# Patient Record
Sex: FEMALE | Race: WHITE | NOT HISPANIC OR LATINO | Employment: STUDENT
[De-identification: names, ages, dates, MRNs, and addresses within clinical notes are randomized per-mention and may not be internally consistent; named-entity substitution may affect disease eponyms.]

---
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
## 2023-01-05 ENCOUNTER — OFFICE VISIT (OUTPATIENT)
Dept: FAMILY MEDICINE | Facility: CLINIC | Age: 18
End: 2023-01-05
Payer: COMMERCIAL

## 2023-01-05 VITALS
DIASTOLIC BLOOD PRESSURE: 76 MMHG | BODY MASS INDEX: 23.69 KG/M2 | RESPIRATION RATE: 18 BRPM | HEIGHT: 65 IN | OXYGEN SATURATION: 98 % | TEMPERATURE: 98.3 F | HEART RATE: 70 BPM | WEIGHT: 142.2 LBS | SYSTOLIC BLOOD PRESSURE: 120 MMHG

## 2023-01-05 DIAGNOSIS — Z00.00 HEALTHCARE MAINTENANCE: ICD-10-CM

## 2023-01-05 DIAGNOSIS — F64.9 GENDER DYSPHORIA: Primary | ICD-10-CM

## 2023-01-05 DIAGNOSIS — F84.0 AUTISM SPECTRUM DISORDER: ICD-10-CM

## 2023-01-05 PROBLEM — F98.8 ATTENTION DEFICIT DISORDER (ADD): Status: ACTIVE | Noted: 2023-01-05

## 2023-01-05 PROBLEM — Z82.49 FAMILY HISTORY OF HIGH BLOOD PRESSURE: Status: ACTIVE | Noted: 2023-01-05

## 2023-01-05 PROBLEM — F41.0 GENERALIZED ANXIETY DISORDER WITH PANIC ATTACKS: Status: ACTIVE | Noted: 2019-09-26

## 2023-01-05 PROBLEM — F41.1 GENERALIZED ANXIETY DISORDER WITH PANIC ATTACKS: Status: ACTIVE | Noted: 2019-09-26

## 2023-01-05 PROCEDURE — 99205 OFFICE O/P NEW HI 60 MIN: CPT | Performed by: NURSE PRACTITIONER

## 2023-01-05 PROCEDURE — 3008F BODY MASS INDEX DOCD: CPT | Performed by: NURSE PRACTITIONER

## 2023-01-05 RX ORDER — NORETHINDRONE ACETATE AND ETHINYL ESTRADIOL .03; 1.5 MG/1; MG/1
TABLET ORAL
Qty: 90 EACH | Refills: 3 | Status: SHIPPED | OUTPATIENT
Start: 2023-01-05 | End: 2023-03-10 | Stop reason: SDUPTHER

## 2023-01-05 RX ORDER — FLUOXETINE 20 MG/1
20 TABLET ORAL DAILY
COMMUNITY
Start: 2022-10-22 | End: 2023-01-18 | Stop reason: SDUPTHER

## 2023-01-05 ASSESSMENT — PATIENT HEALTH QUESTIONNAIRE - PHQ9: SUM OF ALL RESPONSES TO PHQ9 QUESTIONS 1 & 2: 1

## 2023-01-05 NOTE — PATIENT INSTRUCTIONS
Take the pills as they are In the packages  After 2 packages, you can start to skip the placebo pills and only take the ones that are in color (the 3 weeks).

## 2023-01-05 NOTE — PROGRESS NOTES
"      Subjective      Patient ID: Mina Joyner is a 17 y.o. adult.  2005      Here to establish GAHT and discuss options for starting.  Mom and dad are in room, they are supportive but concerned.     HPI      Gender Dysphoria  - recalls 6 years ago had a ffm friend in  which made him think about gender vaguely  -out as bisexual in Middle school, did not seem complicated to him but was for conservative family.  - around 11th grade, started to look more online and read about transgender and gender expression but did not change gender expression out of fear for angering family.   - recalls he used to create characters in stories and would base them around different gender roles  - would try out dffferent pronouns online, she/they, he they  - out at college as Mina and he. Feels comfortable there and well supported  - dysphoria around voice, chest, (wears binder), menses.  - from testosterone wants deeper voice, masculine face, facial hair \"to look generally more masculine\"  - worried about [employer] family and their acceptance/rejection  - worried he will make mom \"really upset\"        The following have been reviewed and updated as appropriate in this visit:   Allergies  Meds  Problems  Surg Hx       Review of Systems  Constitutional: Negative for chills and fever.   Eyes: Negative for visual disturbance.   Respiratory: Negative for shortness of breath.    Cardiovascular: Negative for chest pain and leg swelling.   Gastrointestinal: Negative for abdominal pain, nausea and vomiting.   Genitourinary: Negative for difficulty urinating, dysuria, flank pain, frequency, hematuria and urgency.   Musculoskeletal: Negative for myalgias.   Neurological: Negative for weakness, light-headedness and numbness.   See HPI for abnormal systems and evaluation of them    Patient Active Problem List   Diagnosis   • Attention deficit disorder (ADD)   • Autism spectrum disorder   • Depression   • Generalized anxiety disorder with " "panic attacks   • Family history of high blood pressure   • Gender dysphoria   • Healthcare maintenance       Current Outpatient Medications on File Prior to Visit   Medication Sig Dispense Refill   • FLUoxetine (PROzac) 20 mg tablet Take 20 mg by mouth daily.     • multivitamin capsule daily.       No current facility-administered medications on file prior to visit.         Objective     Vitals:    01/05/23 1657   BP: 120/76   BP Location: Right upper arm   Patient Position: Sitting   Pulse: 70   Resp: 18   Temp: 36.8 °C (98.3 °F)   TempSrc: Temporal   SpO2: 98%   Weight: 64.5 kg (142 lb 3.2 oz)   Height: 1.651 m (5' 5\")     Body mass index is 23.66 kg/m².    Physical Exam  Constitutional:       Comments: Masculine presenting  Short hair, male clothes, wearing binder  confident in male name and expression.    Neurological:      General: No focal deficit present.      Mental Status: He is oriented to person, place, and time.   Psychiatric:      Comments: Shy initially, less so during conversation.   Articulate with good self awareness.           Assessment/Plan   Diagnoses and all orders for this visit:    Gender dysphoria (Primary)  -     norethindrone ac-eth estradioL (LOESTRIN 1.5/30, 21,) 1.5-30 mg-mcg tablet; Take 1 pill each night for 90 days  -     TSH w reflex FT4; Future  -     CBC; Future  -     Comprehensive metabolic panel; Future  -     Testosterone; Future  Meets the following criteria for diagnosis of  gender dysphoria in adolescents and adults as:  A marked incongruence between one’s experienced/expressed gender and their assigned gender, lasting at least 6 months, as manifested by at least two of the following:    A marked incongruence between one’s experienced/expressed gender and primary and/or secondary sex characteristics (or in young adolescents, the anticipated secondary sex characteristics)  A strong desire to be rid of one’s primary and/or secondary sex characteristics because of a marked " incongruence with one’s experienced/expressed gender (or in young adolescents, a desire to prevent the development of the anticipated secondary sex characteristics)  A strong desire for the primary and/or secondary sex characteristics of the other gender  A strong desire to be of the other gender (or some alternative gender different from one’s assigned gender)  A strong desire to be treated as the other gender (or some alternative gender different from one’s assigned gender)  A strong conviction that one has the typical feelings and reactions of the other gender (or some alternative gender different from one’s assigned gender)  In order to meet criteria for the diagnosis, the condition must also be associated with clinically significant distress or impairment in social, occupational, or other important areas of functioning.    · Discussed with patient risks and benefits of taking testosterone and timeline of expected changes from hormones for medical transition  · PMH, medication list and social/sexual and family histories reviewed today  · Guidance provided with specific attention to fertility preservation options, irreversible changes with use of hormones for medical transition including: Deepening voice, development of facial and body hair, changes to genitals and possible loss of hair on head.  · Absence of suicidality confirmed verbally today in visit  · Gender identity confirmed as congruent with gender expression.  · Gender identity is insistent, consistent and persistent as outlined in HPI with criteria met for dx of gender dysphoria  · Labs to be done in 3 months as well as follow up visit at that time.  · Reviewed in visit, nationally recognized guidelines and recommendations for gender affirming hormones for transition.  · Information document was received outlining all above information; Patient verbalizes understanding and verifies that all questions at this time have been answered    · Discussed with  patient risks and benefits of taking testosterone and timeline of expected changes from hormones for medical transition  · PMH, medication list and social/sexual and family histories reviewed today  · Guidance provided with specific attention to fertility preservation options, irreversible changes with use of hormones for medical transition including: Deepening voice, development of facial and body hair, changes to genitals and possible loss of hair on head.  · Absence of suicidality confirmed verbally today in visit  · Gender identity confirmed as congruent with gender expression.  · Gender identity is insistent, consistent and persistent as outlined in HPI with criteria met for dx of gender dysphoria  · Labs to be done in 3 months as well as follow up visit at that time.  · Reviewed in visit, nationally recognized guidelines and recommendations for gender affirming hormones for transition.  · Information document was received outlining all above information; Patient verbalizes understanding and verifies that all questions at this time have been answered      Met with Mina alone and with mom and dad      We agreed to start COBC at this time and plan to start testosterone in arch when he is home from school  Encouraged mom and dad to check out support group, resources provided    We reviewed anticipated changes from testosterone this evening,Mina will sign consent when he returns  I would like to stay in touch with his therapist at school and need that contact information.     I reached out to network to locate a therapist in [place] who can do some specific gender work with him.       Autism  - stable condition  - no changes to Prozac or recommendations for intervention    Healthcare maintenance  -     HIV 1,2 AB P24 AG; Future  -     Hepatitis C antibody; Future      I spent 60 minutes on this date of service performing the following activities: obtaining history, performing examination, entering orders, documenting and  providing counseling and education.

## 2023-01-18 RX ORDER — FLUOXETINE 20 MG/1
20 TABLET ORAL DAILY
Qty: 90 TABLET | Refills: 1 | Status: SHIPPED | OUTPATIENT
Start: 2023-01-18 | End: 2023-07-17

## 2023-01-18 NOTE — TELEPHONE ENCOUNTER
Medicine Refill Request    Last Office: 1/5/2023   Last Consult Visit: Visit date not found  Last Telemedicine Visit: Visit date not found    Next Appointment: 3/8/2023      Current Outpatient Medications:   •  FLUoxetine (PROzac) 20 mg tablet, Take 20 mg by mouth daily., Disp: , Rfl:   •  multivitamin capsule, daily., Disp: , Rfl:   •  norethindrone ac-eth estradioL (LOESTRIN 1.5/30, 21,) 1.5-30 mg-mcg tablet, Take 1 pill each night for 90 days, Disp: 90 each, Rfl: 3      BP Readings from Last 3 Encounters:   01/05/23 120/76       Recent Lab results:  No results found for: CHOL, No results found for: HDL, No results found for: LDLCALC, No results found for: TRIG     No results found for: GLUCOSE, No results found for: HGBA1C      No results found for: CREATININE    No results found for: TSH

## 2023-02-08 DIAGNOSIS — F64.9 GENDER DYSPHORIA: ICD-10-CM

## 2023-02-08 DIAGNOSIS — Z00.00 HEALTHCARE MAINTENANCE: ICD-10-CM

## 2023-03-10 ENCOUNTER — OFFICE VISIT (OUTPATIENT)
Dept: PRIMARY CARE | Facility: CLINIC | Age: 18
End: 2023-03-10
Payer: COMMERCIAL

## 2023-03-10 VITALS
SYSTOLIC BLOOD PRESSURE: 130 MMHG | BODY MASS INDEX: 23.82 KG/M2 | WEIGHT: 143 LBS | HEART RATE: 110 BPM | DIASTOLIC BLOOD PRESSURE: 78 MMHG | RESPIRATION RATE: 16 BRPM | OXYGEN SATURATION: 97 % | TEMPERATURE: 97.9 F | HEIGHT: 65 IN

## 2023-03-10 DIAGNOSIS — L70.0 ACNE VULGARIS: ICD-10-CM

## 2023-03-10 DIAGNOSIS — F64.9 GENDER DYSPHORIA: ICD-10-CM

## 2023-03-10 DIAGNOSIS — F41.1 GENERALIZED ANXIETY DISORDER WITH PANIC ATTACKS: Primary | ICD-10-CM

## 2023-03-10 DIAGNOSIS — F41.0 GENERALIZED ANXIETY DISORDER WITH PANIC ATTACKS: Primary | ICD-10-CM

## 2023-03-10 PROCEDURE — 99215 OFFICE O/P EST HI 40 MIN: CPT | Performed by: NURSE PRACTITIONER

## 2023-03-10 PROCEDURE — 3008F BODY MASS INDEX DOCD: CPT | Performed by: NURSE PRACTITIONER

## 2023-03-10 RX ORDER — NORETHINDRONE ACETATE AND ETHINYL ESTRADIOL .03; 1.5 MG/1; MG/1
TABLET ORAL
Qty: 126 EACH | Refills: 1 | Status: SHIPPED | OUTPATIENT
Start: 2023-03-10 | End: 2023-04-01 | Stop reason: SDUPTHER

## 2023-03-10 RX ORDER — METOPROLOL TARTRATE 25 MG/1
TABLET, FILM COATED ORAL
Qty: 180 TABLET | Refills: 0 | Status: SHIPPED | OUTPATIENT
Start: 2023-03-10 | End: 2023-06-06

## 2023-03-10 RX ORDER — TESTOSTERONE 20.25 MG/1.25G
GEL TOPICAL
Qty: 112.5 G | Refills: 1 | Status: SHIPPED | OUTPATIENT
Start: 2023-03-10 | End: 2023-03-13 | Stop reason: SDUPTHER

## 2023-03-10 NOTE — PATIENT INSTRUCTIONS
Metoprolol   For panic/anxiety  1 pill 1 or 2 times a day  If you start to take it daily, let me know and I will change it to a long acting form      Acne  - cream to your face 2 times a day every day  - message me if your acne worsens before I see you this summer    Testosterone  - I [place] it to the [place] pharmacy  - let me know if it should go elsewhere  - you should hear it is covered within a week, if not, message me    Contraceptive  - start to SKIP the placebo week with your next packet---only take the 3 weeks of the color pills then go to the next packet.

## 2023-03-10 NOTE — PROGRESS NOTES
Subjective      Patient ID: Mina Joyner is a 17 y.o. adult.  2005    Here today with mom and dad to follow-up on gender affirming hormone therapy.    HPI    Anxiety  - having panic attacks, has had since HS  - feels heart racing, tries to relax and to be in a quiet place  - may be related to autism and social stressors  - has been on prozac for a few years  -Denies SI, SA or self-harm.      Acne  - on face currently  - washes but not consistent with management  - mom has history of significant acne which required acutane treatment       Gender Dysphoria  - reports he would like to proceed with taking masculinizing hormones  -He reports he reviewed consent taken home with him after previous visit.  Both parents also reviewed consent.  -Goals remain consistent with those achieved with gender affirming hormones.  This includes but is not limited to deeper voice increase muscle mass, coarser skin, changes to genitals.  -Mina reports he would like to use topical method of delivery.      The following have been reviewed and updated as appropriate in this visit:   Allergies  Meds  Problems       Review of Systems  Constitutional: Negative for chills and fever.   Eyes: Negative for visual disturbance.   Respiratory: Negative for shortness of breath.    Cardiovascular: Negative for chest pain and leg swelling.   Gastrointestinal: Negative for abdominal pain, nausea and vomiting.   Genitourinary: Negative for difficulty urinating, dysuria, flank pain, frequency, hematuria and urgency.   Musculoskeletal: Negative for myalgias.   Neurological: Negative for weakness, light-headedness and numbness.   See HPI for abnormal systems and evaluation of them    Patient Active Problem List   Diagnosis   • Attention deficit disorder (ADD)   • Autism spectrum disorder   • Depression   • Generalized anxiety disorder with panic attacks   • Family history of high blood pressure   • Gender dysphoria   • Healthcare maintenance  "      Current Outpatient Medications on File Prior to Visit   Medication Sig Dispense Refill   • FLUoxetine (PROzac) 20 mg tablet Take 1 tablet (20 mg total) by mouth daily. 90 tablet 1   • multivitamin capsule daily.       No current facility-administered medications on file prior to visit.         Objective     Vitals:    03/10/23 1155   BP: 130/78   BP Location: Right upper arm   Patient Position: Sitting   Pulse: (!) 110   Resp: 16   Temp: 36.6 °C (97.9 °F)   TempSrc: Temporal   SpO2: 97%   Weight: 64.9 kg (143 lb)   Height: 1.651 m (5' 5\")     Body mass index is 23.8 kg/m².    Physical Exam      Pertinent positives as noted above      Physical Exam   Constitutional: oriented to person, place, and time. Appears well-developed and well-nourished. No distress.  Presents in typical masculine clothing, masculine haircut, masculine mannerisms.  Gender expression is consistent with male gender identity.    HENT:     Cardiovascular: Normal rate, regular rhythm and normal heart sounds.    Pulmonary/Chest: Lungs CTA b/l; Effort normal and breath sounds normal. No stridor. No respiratory distress.No wheezing noted, No rales.       Musculoskeletal: Normal range of motion. Patient exhibits no edema or deformity.     Neurological:Alert and oriented to person, place, and time.    Skin: Noted open and closed comedones present on cheeks, chin, forehead.  Noted minimal cystic lesions.  Excessive erythema present.  No acne present on chest upper shoulders or upper back at this time.    Psychiatric: Normal mood and affect. Behavior is appropriate for age and environment.  J is engaged, but has limited eye contact consistent with somebody on the autism spectrum.  Judgment and thought content normal.     Vitals reviewed.      Assessment/Plan   Diagnoses and all orders for this visit:    Generalized anxiety disorder with panic attacks (Primary)  -     metoprolol tartrate (LOPRESSOR) 25 mg tablet; 1 pill 2 times a day as needed for " panic/anxiety for 90 days  -Can consider change of SSRI or change to different agent.  -At this time priority for patient seems to be managing panic attacks and what is persistent anxiety symptoms  -Discussed with patient this medication can be used as needed or taken 1-2 times daily.  -Discussed with him if he begins to take medication daily to please message me so I can change to long-acting agent for more consistent support.      Gender dysphoria  -     norethindrone ac-eth estradioL (LOESTRIN 1.5/30, 21,) 1.5-30 mg-mcg tablet; Take 1 pill each night for 180 days (6 months)- skip placebo week and go to next packet  -     testosterone 1.62 % (20.25 mg/1.25 gram) gel in packet; Apply 1 packet to chest and upper arms daily for 90 days  -     Testosterone; Future  -     Hgb & Hct; Future  -     Comprehensive metabolic panel; Future    Acne vulgaris  -     clascoterone 1 % cream; Apply topically 2 (two) times a day. For 90 days  -Advised patient often acne worsens with initiation of masculinizing hormones.  -Given strong family history for severe acne vulgaris, I advised him to contact me for course of oral antibiotics if acne worsens until he is able to be seen by dermatology for additional treatment.    Follow-up 3 months.  Lab orders entered in for that time.    Time spent today with patient with both parents, and also with patient alone.      I spent 40 minutes on this date of service performing the following activities: obtaining history, performing examination, entering orders, documenting and providing counseling and education.

## 2023-03-13 DIAGNOSIS — F64.9 GENDER DYSPHORIA: ICD-10-CM

## 2023-03-13 RX ORDER — TESTOSTERONE 20.25 MG/1.25G
GEL TOPICAL
Qty: 112.5 G | Refills: 1 | Status: SHIPPED | OUTPATIENT
Start: 2023-03-13 | End: 2023-03-13 | Stop reason: SDUPTHER

## 2023-03-13 RX ORDER — TESTOSTERONE 20.25 MG/1.25G
GEL TOPICAL
Qty: 112.5 G | Refills: 1 | Status: SHIPPED | OUTPATIENT
Start: 2023-03-13 | End: 2023-05-10 | Stop reason: SDUPTHER

## 2023-03-13 RX ORDER — TESTOSTERONE 20.25 MG/1.25G
GEL TOPICAL
Qty: 112.5 G | Refills: 1 | Status: CANCELLED | OUTPATIENT
Start: 2023-03-13

## 2023-03-13 NOTE — TELEPHONE ENCOUNTER
It was sent  to [facility]/pharmacy #[id number] - [place], [place] - [address] AT [address], [place] [address] per chart.

## 2023-03-13 NOTE — TELEPHONE ENCOUNTER
It was sent  to [facility]/pharmacy #[id number] - [place], [place] - [address] AT [address], [place] [address] per chart.

## 2023-03-13 NOTE — TELEPHONE ENCOUNTER
Medication was sent to the wrong pharmacy ([facility]).  Please resend to correct pharmacy ([facility]).

## 2023-03-13 NOTE — TELEPHONE ENCOUNTER
Pt sent portal message requesting medication to be sent to [facility] in [facility]; pharmacy updated in refill request

## 2023-04-01 DIAGNOSIS — F64.9 GENDER DYSPHORIA: ICD-10-CM

## 2023-04-03 RX ORDER — NORETHINDRONE ACETATE AND ETHINYL ESTRADIOL .03; 1.5 MG/1; MG/1
TABLET ORAL
Qty: 126 EACH | Refills: 1 | Status: SHIPPED | OUTPATIENT
Start: 2023-04-03 | End: 2023-06-04 | Stop reason: SDUPTHER

## 2023-05-10 DIAGNOSIS — F64.9 GENDER DYSPHORIA: ICD-10-CM

## 2023-05-11 RX ORDER — TESTOSTERONE 20.25 MG/1.25G
GEL TOPICAL
Qty: 112.5 G | Refills: 1 | Status: SHIPPED | OUTPATIENT
Start: 2023-05-11 | End: 2023-05-30 | Stop reason: SDUPTHER

## 2023-05-30 DIAGNOSIS — F64.9 GENDER DYSPHORIA: ICD-10-CM

## 2023-05-30 RX ORDER — TESTOSTERONE 20.25 MG/1.25G
GEL TOPICAL
Qty: 112.5 G | Refills: 1 | Status: SHIPPED | OUTPATIENT
Start: 2023-05-30 | End: 2023-06-14 | Stop reason: SDUPTHER

## 2023-06-04 DIAGNOSIS — F64.9 GENDER DYSPHORIA: ICD-10-CM

## 2023-06-05 RX ORDER — NORETHINDRONE ACETATE AND ETHINYL ESTRADIOL .03; 1.5 MG/1; MG/1
TABLET ORAL
Qty: 126 EACH | Refills: 1 | Status: SHIPPED | OUTPATIENT
Start: 2023-06-05 | End: 2023-06-14 | Stop reason: SDUPTHER

## 2023-06-06 DIAGNOSIS — F41.1 GENERALIZED ANXIETY DISORDER WITH PANIC ATTACKS: ICD-10-CM

## 2023-06-06 DIAGNOSIS — F41.0 GENERALIZED ANXIETY DISORDER WITH PANIC ATTACKS: ICD-10-CM

## 2023-06-06 RX ORDER — DOXYCYCLINE HYCLATE 100 MG
100 TABLET ORAL 2 TIMES DAILY
Qty: 60 TABLET | Refills: 0 | Status: SHIPPED | OUTPATIENT
Start: 2023-06-06 | End: 2023-07-10

## 2023-06-06 RX ORDER — METOPROLOL TARTRATE 25 MG/1
TABLET, FILM COATED ORAL
Qty: 180 TABLET | Refills: 0 | Status: SHIPPED | OUTPATIENT
Start: 2023-06-06 | End: 2023-08-16 | Stop reason: SDUPTHER

## 2023-06-14 DIAGNOSIS — F64.9 GENDER DYSPHORIA: ICD-10-CM

## 2023-06-14 RX ORDER — NORETHINDRONE ACETATE AND ETHINYL ESTRADIOL .03; 1.5 MG/1; MG/1
TABLET ORAL
Qty: 126 EACH | Refills: 1 | Status: SHIPPED | OUTPATIENT
Start: 2023-06-14 | End: 2023-08-16 | Stop reason: SDUPTHER

## 2023-06-14 RX ORDER — TESTOSTERONE 20.25 MG/1.25G
1 GEL TOPICAL DAILY
Qty: 112.5 G | Refills: 1 | Status: SHIPPED | OUTPATIENT
Start: 2023-06-14 | End: 2023-09-12

## 2023-07-10 RX ORDER — DOXYCYCLINE HYCLATE 100 MG
100 TABLET ORAL 2 TIMES DAILY
Qty: 60 TABLET | Refills: 0 | Status: SHIPPED | OUTPATIENT
Start: 2023-07-10 | End: 2023-07-21

## 2023-07-17 RX ORDER — FLUOXETINE 20 MG/1
20 TABLET ORAL DAILY
Qty: 90 TABLET | Refills: 1 | Status: SHIPPED | OUTPATIENT
Start: 2023-07-17 | End: 2023-07-21 | Stop reason: SDUPTHER

## 2023-07-17 NOTE — TELEPHONE ENCOUNTER
Medicine Refill Request    Last Office Visit: 1/5/2023   Last Consult Visit: Visit date not found  Last Telemedicine Visit: Visit date not found    Next Appointment: Visit date not found      Current Outpatient Medications:   •  doxycycline hyclate (VIBRA-TABS) 100 mg tablet, TAKE 1 TABLET BY MOUTH TWICE A DAY, Disp: 60 tablet, Rfl: 0  •  FLUoxetine (PROzac) 20 mg tablet, Take 1 tablet (20 mg total) by mouth daily., Disp: 90 tablet, Rfl: 1  •  metoprolol tartrate (LOPRESSOR) 25 mg tablet, TAKE 1 TABLET BY MOUTH TWICE DAILY AS NEEDED FOR PANIC/ANXIETY FOR 90 DAYS, Disp: 180 tablet, Rfl: 0  •  multivitamin capsule, daily., Disp: , Rfl:   •  norethindrone ac-eth estradioL (LOESTRIN 1.5/30, 21,) 1.5-30 mg-mcg tablet, Take 1 pill each night for 180 days (6 months)- skip placebo week and go to next packet, Disp: 126 each, Rfl: 1  •  testosterone 1.62 % (20.25 mg/1.25 gram) gel in packet, Place 1 packet on the skin daily. Apply 1 packet to chest and upper arms daily for 90 days, Disp: 112.5 g, Rfl: 1      BP Readings from Last 3 Encounters:   03/10/23 130/78   01/05/23 120/76       Recent Lab results:  No results found for: CHOL, No results found for: HDL, No results found for: LDLCALC, No results found for: TRIG     No results found for: GLUCOSE, No results found for: HGBA1C      No results found for: CREATININE    No results found for: TSH      No results found for: HGBA1C

## 2023-07-21 ENCOUNTER — APPOINTMENT (OUTPATIENT)
Dept: LAB | Facility: CLINIC | Age: 18
End: 2023-07-21
Attending: NURSE PRACTITIONER
Payer: COMMERCIAL

## 2023-07-21 ENCOUNTER — OFFICE VISIT (OUTPATIENT)
Dept: PRIMARY CARE | Facility: CLINIC | Age: 18
End: 2023-07-21
Payer: COMMERCIAL

## 2023-07-21 VITALS
SYSTOLIC BLOOD PRESSURE: 125 MMHG | DIASTOLIC BLOOD PRESSURE: 73 MMHG | TEMPERATURE: 98.1 F | WEIGHT: 156.8 LBS | RESPIRATION RATE: 16 BRPM | BODY MASS INDEX: 26.09 KG/M2 | OXYGEN SATURATION: 97 % | HEART RATE: 64 BPM

## 2023-07-21 DIAGNOSIS — F41.1 GENERALIZED ANXIETY DISORDER WITH PANIC ATTACKS: ICD-10-CM

## 2023-07-21 DIAGNOSIS — F64.9 GENDER DYSPHORIA: Primary | ICD-10-CM

## 2023-07-21 DIAGNOSIS — F41.0 GENERALIZED ANXIETY DISORDER WITH PANIC ATTACKS: ICD-10-CM

## 2023-07-21 DIAGNOSIS — L70.0 ACNE VULGARIS: ICD-10-CM

## 2023-07-21 DIAGNOSIS — F64.9 GENDER DYSPHORIA: ICD-10-CM

## 2023-07-21 LAB
ALBUMIN SERPL-MCNC: 4.2 G/DL (ref 3.5–5.7)
ALP SERPL-CCNC: 52 IU/L (ref 34–104)
ALT SERPL-CCNC: 14 IU/L (ref 7–52)
ANION GAP SERPL CALC-SCNC: 5 MEQ/L (ref 3–15)
AST SERPL-CCNC: 14 IU/L (ref 13–39)
BILIRUB SERPL-MCNC: 0.4 MG/DL (ref 0.3–1)
BUN SERPL-MCNC: 11 MG/DL (ref 7–25)
CALCIUM SERPL-MCNC: 9.1 MG/DL (ref 8.6–10.3)
CHLORIDE SERPL-SCNC: 106 MEQ/L (ref 98–107)
CO2 SERPL-SCNC: 26 MEQ/L (ref 21–31)
CREAT SERPL-MCNC: 0.6 MG/DL (ref 0.6–1.2)
GFR SERPL CREATININE-BSD FRML MDRD: >60 ML/MIN/1.73M*2
GLUCOSE SERPL-MCNC: 76 MG/DL (ref 70–99)
HCT VFR BLDCO AUTO: 42.8 % (ref 35–45)
HGB BLD-MCNC: 14.1 G/DL (ref 11.8–15.7)
POTASSIUM SERPL-SCNC: 4.6 MEQ/L (ref 3.5–5.1)
PROT SERPL-MCNC: 7.4 G/DL (ref 6.4–8.9)
SODIUM SERPL-SCNC: 137 MEQ/L (ref 136–145)
TESTOST SERPL-MCNC: 255.5 NG/DL (ref 7.2–79.3)

## 2023-07-21 PROCEDURE — 85018 HEMOGLOBIN: CPT

## 2023-07-21 PROCEDURE — 36415 COLL VENOUS BLD VENIPUNCTURE: CPT

## 2023-07-21 PROCEDURE — 99214 OFFICE O/P EST MOD 30 MIN: CPT | Performed by: NURSE PRACTITIONER

## 2023-07-21 PROCEDURE — 80053 COMPREHEN METABOLIC PANEL: CPT

## 2023-07-21 PROCEDURE — 3008F BODY MASS INDEX DOCD: CPT | Performed by: NURSE PRACTITIONER

## 2023-07-21 PROCEDURE — 84403 ASSAY OF TOTAL TESTOSTERONE: CPT

## 2023-07-21 RX ORDER — FLUOXETINE 20 MG/1
20 TABLET ORAL DAILY
Qty: 90 TABLET | Refills: 1 | Status: SHIPPED | OUTPATIENT
Start: 2023-07-21 | End: 2023-11-18 | Stop reason: SDUPTHER

## 2023-07-21 RX ORDER — AZITHROMYCIN 500 MG/1
TABLET, FILM COATED ORAL
Qty: 12 TABLET | Refills: 0 | Status: SHIPPED | OUTPATIENT
Start: 2023-07-21 | End: 2023-12-13

## 2023-07-21 NOTE — PROGRESS NOTES
Subjective      Patient ID: Mina Joyner is a 17 y.o. adult.  2005    Here today for routine gender affirming care post hormone start.  Returning to college in August.      HPI    Gender Dysphoria  - on testosterone for 4 months  - feels stronger  - voice not changed  - no menses- on COBC  - + bottom growth  - no changes to hair growth  -Applying 1 packet daily  - feels good with changes to this time.  Seeking further masculinization  - socially things are going well, has expected concerns with being in [place] for school. Returns in August.       Acne  - did not tolerate doxy, upset stomach with each dose  - uses gel 1 time a day for topical treatment  - acne on chest , shoulders, face, back.       Anxiety  - feels prozac is helpful  - no panic attacks  - would like to remain on it at the current dose.   - uses metoprolol less than daily but finds it to be helpful for preventing panic attacks.       The following have been reviewed and updated as appropriate in this visit:        Review of Systems  Constitutional: Negative for chills and fever.   Eyes: Negative for visual disturbance.   Respiratory: Negative for shortness of breath.    Cardiovascular: Negative for chest pain and leg swelling.   Gastrointestinal: Negative for abdominal pain, nausea and vomiting.   Genitourinary: Negative for difficulty urinating, dysuria, flank pain, frequency, hematuria and urgency.   Musculoskeletal: Negative for myalgias.   Neurological: Negative for weakness, light-headedness and numbness.   See HPI for abnormal systems and evaluation of them    Patient Active Problem List   Diagnosis   • Attention deficit disorder (ADD)   • Autism spectrum disorder   • Depression   • Generalized anxiety disorder with panic attacks   • Family history of high blood pressure   • Gender dysphoria   • Healthcare maintenance       Current Outpatient Medications on File Prior to Visit   Medication Sig Dispense Refill   • metoprolol tartrate  (LOPRESSOR) 25 mg tablet TAKE 1 TABLET BY MOUTH TWICE DAILY AS NEEDED FOR PANIC/ANXIETY FOR 90 DAYS 180 tablet 0   • norethindrone ac-eth estradioL (LOESTRIN 1.5/30, 21,) 1.5-30 mg-mcg tablet Take 1 pill each night for 180 days (6 months)- skip placebo week and go to next packet 126 each 1   • testosterone 1.62 % (20.25 mg/1.25 gram) gel in packet Place 1 packet on the skin daily. Apply 1 packet to chest and upper arms daily for 90 days 112.5 g 1     No current facility-administered medications on file prior to visit.       Wt Readings from Last 3 Encounters:   07/21/23 71.1 kg (156 lb 12.8 oz) (85 %, Z= 1.04)*   03/10/23 64.9 kg (143 lb) (73 %, Z= 0.62)*   01/05/23 64.5 kg (142 lb 3.2 oz) (73 %, Z= 0.61)*     * Growth percentiles are based on CDC (Girls, 2-20 Years) data.     Temp Readings from Last 3 Encounters:   07/21/23 36.7 °C (98.1 °F)   03/10/23 36.6 °C (97.9 °F) (Temporal)   01/05/23 36.8 °C (98.3 °F) (Temporal)     BP Readings from Last 3 Encounters:   07/21/23 125/73   03/10/23 130/78   01/05/23 120/76     Pulse Readings from Last 3 Encounters:   07/21/23 64   03/10/23 (!) 110   01/05/23 70       Objective     Vitals:    07/21/23 0906   BP: 125/73   Pulse: 64   Resp: 16   Temp: 36.7 °C (98.1 °F)   SpO2: 97%   Weight: 71.1 kg (156 lb 12.8 oz)     Body mass index is 26.09 kg/m².    Physical Exam  Constitutional:       Appearance: Normal appearance.   Cardiovascular:      Rate and Rhythm: Regular rhythm.      Heart sounds: Normal heart sounds.   Pulmonary:      Breath sounds: Normal breath sounds.   Skin:     Comments: Open and closed comedones on face and shoulders   Neurological:      Mental Status: He is alert.         Assessment/Plan   Diagnoses and all orders for this visit:    Gender dysphoria (Primary)  -     Comprehensive metabolic panel; Future  -     Hgb & Hct; Future  -     Testosterone; Future  -Doing well  -Skeletonizing slowly but has good alleviation of dysphoria  -Routine labs.  -I anticipate  when I see him back in December we will be increasing testosterone either in gel form or changing to injections.    Generalized anxiety disorder with panic attacks  -     FLUoxetine (PROzac) 20 mg tablet; Take 1 tablet (20 mg total) by mouth daily.  -Stable condition  -No changes.    Acne vulgaris  -     Ambulatory referral to Dermatology; Future  -     azithromycin (ZITHROMAX) 500 mg tablet; Take 1 pill 2 times a week for 6 weeks  -Did not tolerate doxycycline.  Discussed with him today azithromycin is not particularly effective when used without doxycycline  -As with most transmission on testosterone acne is worst between 6 and 18 months and then begins to improve.  -Encouraged him to continue to use topical treatment  -He is likely a good candidate for Accutane and I asked him to discuss this with dermatologist when he sees her.    Follow-up 6 months      I spent 30 minutes on this date of service performing the following activities: obtaining history, performing examination, entering orders, documenting and providing counseling and education.

## 2023-08-16 DIAGNOSIS — F64.9 GENDER DYSPHORIA: ICD-10-CM

## 2023-08-16 DIAGNOSIS — F41.0 GENERALIZED ANXIETY DISORDER WITH PANIC ATTACKS: ICD-10-CM

## 2023-08-16 DIAGNOSIS — F41.1 GENERALIZED ANXIETY DISORDER WITH PANIC ATTACKS: ICD-10-CM

## 2023-08-17 RX ORDER — METOPROLOL TARTRATE 25 MG/1
TABLET, FILM COATED ORAL
Qty: 180 TABLET | Refills: 0 | Status: SHIPPED | OUTPATIENT
Start: 2023-08-17

## 2023-08-17 RX ORDER — NORETHINDRONE ACETATE AND ETHINYL ESTRADIOL .03; 1.5 MG/1; MG/1
TABLET ORAL
Qty: 126 EACH | Refills: 1 | Status: SHIPPED | OUTPATIENT
Start: 2023-08-17 | End: 2024-02-10 | Stop reason: SDUPTHER

## 2023-11-18 DIAGNOSIS — F41.0 GENERALIZED ANXIETY DISORDER WITH PANIC ATTACKS: ICD-10-CM

## 2023-11-18 DIAGNOSIS — F41.1 GENERALIZED ANXIETY DISORDER WITH PANIC ATTACKS: ICD-10-CM

## 2023-11-20 NOTE — TELEPHONE ENCOUNTER
"Medicine Refill Request    Last Office Visit: 7/21/2023   Last Consult Visit: Visit date not found  Last Telemedicine Visit: Visit date not found    Next Appointment: 12/13/2023      Current Outpatient Medications:     azithromycin (ZITHROMAX) 500 mg tablet, Take 1 pill 2 times a week for 6 weeks, Disp: 12 tablet, Rfl: 0    FLUoxetine (PROzac) 20 mg tablet, Take 1 tablet (20 mg total) by mouth daily., Disp: 90 tablet, Rfl: 1    metoprolol tartrate (LOPRESSOR) 25 mg tablet, TAKE 1 TABLET BY MOUTH TWICE DAILY AS NEEDED FOR PANIC/ANXIETY FOR 90 DAYS, Disp: 180 tablet, Rfl: 0    norethindrone ac-eth estradioL (LOESTRIN 1.5/30, 21,) 1.5-30 mg-mcg tablet, Take 1 pill each night for 180 days (6 months)- skip placebo week and go to next packet, Disp: 126 each, Rfl: 1      BP Readings from Last 3 Encounters:   07/21/23 125/73   03/10/23 130/78   01/05/23 120/76       Recent Lab results:  No results found for: \"CHOL\", No results found for: \"HDL\", No results found for: \"LDLCALC\", No results found for: \"TRIG\"     Lab Results   Component Value Date    GLUCOSE 76 07/21/2023   , No results found for: \"HGBA1C\"      Lab Results   Component Value Date    CREATININE 0.6 07/21/2023       No results found for: \"TSH\"      No results found for: \"HGBA1C\"  "

## 2023-11-21 RX ORDER — FLUOXETINE 20 MG/1
20 TABLET ORAL DAILY
Qty: 90 TABLET | Refills: 1 | Status: SHIPPED | OUTPATIENT
Start: 2023-11-21 | End: 2024-01-30 | Stop reason: SDUPTHER

## 2023-12-13 ENCOUNTER — OFFICE VISIT (OUTPATIENT)
Dept: PRIMARY CARE | Facility: CLINIC | Age: 18
End: 2023-12-13
Payer: COMMERCIAL

## 2023-12-13 VITALS
SYSTOLIC BLOOD PRESSURE: 122 MMHG | DIASTOLIC BLOOD PRESSURE: 78 MMHG | WEIGHT: 157.8 LBS | RESPIRATION RATE: 16 BRPM | TEMPERATURE: 98.3 F | OXYGEN SATURATION: 98 % | BODY MASS INDEX: 26.29 KG/M2 | HEART RATE: 78 BPM | HEIGHT: 65 IN

## 2023-12-13 DIAGNOSIS — L70.0 ACNE VULGARIS: Primary | ICD-10-CM

## 2023-12-13 DIAGNOSIS — F64.9 GENDER DYSPHORIA: ICD-10-CM

## 2023-12-13 PROCEDURE — 3008F BODY MASS INDEX DOCD: CPT | Performed by: NURSE PRACTITIONER

## 2023-12-13 PROCEDURE — 99214 OFFICE O/P EST MOD 30 MIN: CPT | Performed by: NURSE PRACTITIONER

## 2023-12-13 RX ORDER — TESTOSTERONE CYPIONATE 200 MG/ML
60 INJECTION, SOLUTION INTRAMUSCULAR WEEKLY
Qty: 10 ML | Refills: 0 | Status: SHIPPED | OUTPATIENT
Start: 2023-12-13 | End: 2024-07-02 | Stop reason: SDUPTHER

## 2023-12-13 RX ORDER — CEPHALEXIN 500 MG/1
500 CAPSULE ORAL 2 TIMES DAILY
Qty: 20 CAPSULE | Refills: 0 | Status: SHIPPED | OUTPATIENT
Start: 2023-12-13 | End: 2023-12-23

## 2023-12-13 RX ORDER — SULFAMETHOXAZOLE AND TRIMETHOPRIM 800; 160 MG/1; MG/1
1 TABLET ORAL 2 TIMES DAILY
Qty: 20 TABLET | Refills: 0 | Status: SHIPPED | OUTPATIENT
Start: 2023-12-13 | End: 2023-12-23

## 2023-12-13 ASSESSMENT — ENCOUNTER SYMPTOMS
NERVOUS/ANXIOUS: 0
SLEEP DISTURBANCE: 0

## 2023-12-13 NOTE — PROGRESS NOTES
"      Subjective      Patient ID: Mina Joyner is a 18 y.o. adult.  2005      HPI      planned wisdom tooth extraction 12/22/23    Gender dysphoria  - on testosterone for 9 months  - Feeling more affirmed  - Voice deepening, bottom growth reported  - Acne is getting worse  - Partner Alina, dating for a year or two    Acne  - hasn't made an appointment with derm yet 2/2 being away at college  - reports it being worse on face, arms, back and chest  - could only tolerate the azithromycin for about a month 2/2 severe GI upset, no appetite    Anxiety  - better now that finals are over  - avoided metoprolol while doing presentation after syncope with LOC  - has taken it since for flying and this appointment without issue    Does not want flu shot at this time.        The following have been reviewed and updated as appropriate in this visit:        Review of Systems   Psychiatric/Behavioral: Negative for behavioral problems, self-injury, sleep disturbance and suicidal ideas. The patient is not nervous/anxious (baseline anxiety).    All other systems reviewed and are negative.      Objective     Vitals:    12/13/23 1536   BP: 122/78   Pulse: 78   Resp: 16   Temp: 36.8 °C (98.3 °F)   TempSrc: Temporal   SpO2: 98%   Weight: 71.6 kg (157 lb 12.8 oz)   Height: 1.651 m (5' 5\")     Body mass index is 26.26 kg/m².    Physical Exam  Constitutional:       Appearance: He is not ill-appearing.   HENT:      Head: Normocephalic and atraumatic.   Cardiovascular:      Rate and Rhythm: Regular rhythm.      Heart sounds: Normal heart sounds. No murmur heard.     No friction rub. No gallop.   Pulmonary:      Effort: Pulmonary effort is normal. No respiratory distress.      Breath sounds: Normal breath sounds. No stridor. No wheezing, rhonchi or rales.   Chest:      Chest wall: No tenderness.   Musculoskeletal:         General: Normal range of motion.      Cervical back: Normal range of motion.   Skin:     General: Skin is warm.      " Comments: Inflamed papules, pustules, closed and open comedones present on their face, back, shoulders and upper arms.   Neurological:      General: No focal deficit present.      Mental Status: He is alert and oriented to person, place, and time.   Psychiatric:         Mood and Affect: Mood normal.         Behavior: Behavior normal.         Thought Content: Thought content normal.         Judgment: Judgment normal.         Assessment/Plan   Diagnoses and all orders for this visit:    Acne vulgaris (Primary)  -     sulfamethoxazole-trimethoprim (BACTRIM DS) 800-160 mg per tablet; Take 1 tablet by mouth 2 (two) times a day for 10 days.  -     cephalexin (KEFLEX) 500 mg capsule; Take 1 capsule (500 mg total) by mouth 2 (two) times a day for 10 days.  - Dr. Awan's office contacted to try to get him seen prior to returning to [place] for school on Jan 6.  - spoke to Dr Awan in anticipation of Mina being seen for acutane consult.   - Mina's mental health is stable enough for medically necessary treatment of sever acne.     Gender dysphoria  -     testosterone cypionate (DEPO-TESTOSTERONE) 200 mg/mL injection; Inject 0.3 mL (60 mg total) under the skin once a week.        -  We discussed switching to injectable T to avoid irritation of his upper arm acne. He will use the Gel on his thighs or stomach until he has the supplied to switch.    I have discussed this patient's care with the student. I have reviewed the patient's history and student's findings on exam, the patient's diagnosis/differential diagnosis and treatment plan. I, as the teaching clinician, verify in the medical record all student documentation and findings, including history, physical exam, and/or medical decision making. I have repeated the physical exam performed by this student.         I spent 30 minutes on this date of service performing the following activities: obtaining history, performing examination, entering orders, documenting, providing  counseling and education and coordinating care.

## 2023-12-13 NOTE — PATIENT INSTRUCTIONS
"Dr. Neeraj Awan  Email for an appointment:  Nargis Webster at [email or web address]   OR  call the office at [phone], and dial \"0\" to speak to the front office  [address] [place] [address], [place] [address], [place] [address]      "

## 2023-12-18 ENCOUNTER — APPOINTMENT (RX ONLY)
Dept: URBAN - METROPOLITAN AREA CLINIC 28 | Facility: CLINIC | Age: 18
Setting detail: DERMATOLOGY
End: 2023-12-18

## 2023-12-18 DIAGNOSIS — L70.0 ACNE VULGARIS: ICD-10-CM | Status: INADEQUATELY CONTROLLED

## 2023-12-18 DIAGNOSIS — Z79.899 OTHER LONG TERM (CURRENT) DRUG THERAPY: ICD-10-CM

## 2023-12-18 PROCEDURE — 81025 URINE PREGNANCY TEST: CPT

## 2023-12-18 PROCEDURE — ? HIGH RISK MEDICATION MONITORING

## 2023-12-18 PROCEDURE — ? PHOTO-DOCUMENTATION

## 2023-12-18 PROCEDURE — ? COUNSELING

## 2023-12-18 PROCEDURE — ? ORDER TESTS

## 2023-12-18 PROCEDURE — ? URINE PREGNANCY TEST

## 2023-12-18 PROCEDURE — 99204 OFFICE O/P NEW MOD 45 MIN: CPT

## 2023-12-18 PROCEDURE — ? ISOTRETINOIN INITIATION

## 2023-12-18 PROCEDURE — ? PRESCRIPTION MEDICATION MANAGEMENT

## 2023-12-18 ASSESSMENT — LOCATION DETAILED DESCRIPTION DERM
LOCATION DETAILED: LEFT CENTRAL MALAR CHEEK
LOCATION DETAILED: LEFT PROXIMAL POSTERIOR UPPER ARM
LOCATION DETAILED: RIGHT PROXIMAL POSTERIOR UPPER ARM

## 2023-12-18 ASSESSMENT — LOCATION SIMPLE DESCRIPTION DERM
LOCATION SIMPLE: LEFT CHEEK
LOCATION SIMPLE: LEFT POSTERIOR UPPER ARM
LOCATION SIMPLE: RIGHT POSTERIOR UPPER ARM

## 2023-12-18 ASSESSMENT — LOCATION ZONE DERM
LOCATION ZONE: ARM
LOCATION ZONE: FACE

## 2023-12-18 NOTE — PROCEDURE: PRESCRIPTION MEDICATION MANAGEMENT
Render In Strict Bullet Format?: No
Continue Regimen: Sulfamethoxazole-trimethoprim 800mg-160mg capsule
Detail Level: Zone
Plan: Accutane (pt is hesitant) - PCP Dimitris Priest on board with plan.  Complicated by both hesitancy and hx of depression (stable per PCP and patient).  Also, patient is in [place] usually as a college student.  Will see what they want to do, but may need to transfer care to a [place] dermatologist - I have reached out to Dr. James Price in [place], [place] to see if he can assist.  I expect we will need to add prednisone taper to start.
Discontinue Regimen: Cephalexin 500mg capsule

## 2023-12-18 NOTE — PROCEDURE: HIGH RISK MEDICATION MONITORING
Elidel Counseling: Patient may experience a mild burning sensation during topical application. Elidel is not approved in children less than 2 years of age. There have been case reports of hematologic and skin malignancies in patients using topical calcineurin inhibitors although causality is questionable.
Tranexamic Acid Counseling:  Patient advised of the small risk of bleeding problems with tranexamic acid. They were also instructed to call if they developed any nausea, vomiting or diarrhea. All of the patient's questions and concerns were addressed.
Prednisone Counseling:  I discussed with the patient the risks of prolonged use of prednisone including but not limited to weight gain, insomnia, osteoporosis, mood changes, diabetes, susceptibility to infection, glaucoma and high blood pressure.  In cases where prednisone use is prolonged, patients should be monitored with blood pressure checks, serum glucose levels and an eye exam.  Additionally, the patient may need to be placed on GI prophylaxis, PCP prophylaxis, and calcium and vitamin D supplementation and/or a bisphosphonate.  The patient verbalized understanding of the proper use and the possible adverse effects of prednisone.  All of the patient's questions and concerns were addressed.
Azathioprine Pregnancy And Lactation Text: This medication is Pregnancy Category D and isn't considered safe during pregnancy. It is unknown if this medication is excreted in breast milk.
Benzoyl Peroxide Pregnancy And Lactation Text: This medication is Pregnancy Category C. It is unknown if benzoyl peroxide is excreted in breast milk.
Simponi Pregnancy And Lactation Text: The risk during pregnancy and breastfeeding is uncertain with this medication.
Xolair Counseling:  Patient informed of potential adverse effects including but not limited to fever, muscle aches, rash and allergic reactions.  The patient verbalized understanding of the proper use and possible adverse effects of Xolair.  All of the patient's questions and concerns were addressed.
Dutasteride Male Counseling: Dustasteride Counseling:  I discussed with the patient the risks of use of dutasteride including but not limited to decreased libido, decreased ejaculate volume, and gynecomastia. Women who can become pregnant should not handle medication.  All of the patient's questions and concerns were addressed.
Isotretinoin Counseling: Patient should get monthly blood tests, not donate blood, not drive at night if vision affected, not share medication, and not undergo elective surgery for 6 months after tx completed. Side effects reviewed, pt to contact office should one occur.
Colchicine Pregnancy And Lactation Text: This medication is Pregnancy Category C and isn't considered safe during pregnancy. It is excreted in breast milk.
Include Pregnancy/Lactation Warning?: No
Sotyktu Counseling:  I discussed the most common side effects of Sotyktu including: common cold, sore throat, sinus infections, cold sores, canker sores, folliculitis, and acne.  I also discussed more serious side effects of Sotyktu including but not limited to: serious allergic reactions; increased risk for infections such as TB; cancers such as lymphomas; rhabdomyolysis and elevated CPK; and elevated triglycerides and liver enzymes. 
Zyclara Pregnancy And Lactation Text: This medication is Pregnancy Category C. It is unknown if this medication is excreted in breast milk.
Sarecycline Pregnancy And Lactation Text: This medication is Pregnancy Category D and not consider safe during pregnancy. It is also excreted in breast milk.
Hydroxyzine Counseling: Patient advised that the medication is sedating and not to drive a car after taking this medication.  Patient informed of potential adverse effects including but not limited to dry mouth, urinary retention, and blurry vision.  The patient verbalized understanding of the proper use and possible adverse effects of hydroxyzine.  All of the patient's questions and concerns were addressed.
Cimzia Counseling:  I discussed with the patient the risks of Cimzia including but not limited to immunosuppression, allergic reactions and infections.  The patient understands that monitoring is required including a PPD at baseline and must alert us or the primary physician if symptoms of infection or other concerning signs are noted.
Topical Ketoconazole Counseling: Patient counseled that this medication may cause skin irritation or allergic reactions.  In the event of skin irritation, the patient was advised to reduce the amount of the drug applied or use it less frequently.   The patient verbalized understanding of the proper use and possible adverse effects of ketoconazole.  All of the patient's questions and concerns were addressed.
Metronidazole Counseling:  I discussed with the patient the risks of metronidazole including but not limited to seizures, nausea/vomiting, a metallic taste in the mouth, nausea/vomiting and severe allergy.
Humira Pregnancy And Lactation Text: This medication is Pregnancy Category B and is considered safe during pregnancy. It is unknown if this medication is excreted in breast milk.
Mirvaso Counseling: Mirvaso is a topical medication which can decrease superficial blood flow where applied. Side effects are uncommon and include stinging, redness and allergic reactions.
Rhofade Pregnancy And Lactation Text: This medication has not been assigned a Pregnancy Risk Category. It is unknown if the medication is excreted in breast milk.
Otezla Counseling: The side effects of Otezla were discussed with the patient, including but not limited to worsening or new depression, weight loss, diarrhea, nausea, upper respiratory tract infection, and headache. Patient instructed to call the office should any adverse effect occur.  The patient verbalized understanding of the proper use and possible adverse effects of Otezla.  All the patient's questions and concerns were addressed.
Bactrim Pregnancy And Lactation Text: This medication is Pregnancy Category D and is known to cause fetal risk.  It is also excreted in breast milk.
Low Dose Naltrexone Pregnancy And Lactation Text: Naltrexone is pregnancy category C.  There have been no adequate and well-controlled studies in pregnant women.  It should be used in pregnancy only if the potential benefit justifies the potential risk to the fetus.   Limited data indicates that naltrexone is minimally excreted into breastmilk.
Ketoconazole Counseling:   Patient counseled regarding improving absorption with orange juice.  Adverse effects include but are not limited to breast enlargement, headache, diarrhea, nausea, upset stomach, liver function test abnormalities, taste disturbance, and stomach pain.  There is a rare possibility of liver failure that can occur when taking ketoconazole. The patient understands that monitoring of LFTs may be required, especially at baseline. The patient verbalized understanding of the proper use and possible adverse effects of ketoconazole.  All of the patient's questions and concerns were addressed.
Methotrexate Pregnancy And Lactation Text: This medication is Pregnancy Category X and is known to cause fetal harm. This medication is excreted in breast milk.
Azathioprine Counseling:  I discussed with the patient the risks of azathioprine including but not limited to myelosuppression, immunosuppression, hepatotoxicity, lymphoma, and infections.  The patient understands that monitoring is required including baseline LFTs, Creatinine, possible TPMP genotyping and weekly CBCs for the first month and then every 2 weeks thereafter.  The patient verbalized understanding of the proper use and possible adverse effects of azathioprine.  All of the patient's questions and concerns were addressed.
Carac Counseling:  I discussed with the patient the risks of Carac including but not limited to erythema, scaling, itching, weeping, crusting, and pain.
Thalidomide Pregnancy And Lactation Text: This medication is Pregnancy Category X and is absolutely contraindicated during pregnancy. It is unknown if it is excreted in breast milk.
Simponi Counseling:  I discussed with the patient the risks of golimumab including but not limited to myelosuppression, immunosuppression, autoimmune hepatitis, demyelinating diseases, lymphoma, and serious infections.  The patient understands that monitoring is required including a PPD at baseline and must alert us or the primary physician if symptoms of infection or other concerning signs are noted.
Tetracycline Counseling: Patient counseled regarding possible photosensitivity and increased risk for sunburn.  Patient instructed to avoid sunlight, if possible.  When exposed to sunlight, patients should wear protective clothing, sunglasses, and sunscreen.  The patient was instructed to call the office immediately if the following severe adverse effects occur:  hearing changes, easy bruising/bleeding, severe headache, or vision changes.  The patient verbalized understanding of the proper use and possible adverse effects of tetracycline.  All of the patient's questions and concerns were addressed. Patient understands to avoid pregnancy while on therapy due to potential birth defects.
Rinvoq Pregnancy And Lactation Text: Based on animal studies, Rinvoq may cause embryo-fetal harm when administered to pregnant women.  The medication should not be used in pregnancy.  Breastfeeding is not recommended during treatment and for 6 days after the last dose.
Dapsone Counseling: I discussed with the patient the risks of dapsone including but not limited to hemolytic anemia, agranulocytosis, rashes, methemoglobinemia, kidney failure, peripheral neuropathy, headaches, GI upset, and liver toxicity.  Patients who start dapsone require monitoring including baseline LFTs and weekly CBCs for the first month, then every month thereafter.  The patient verbalized understanding of the proper use and possible adverse effects of dapsone.  All of the patient's questions and concerns were addressed.
Cimzia Pregnancy And Lactation Text: This medication crosses the placenta but can be considered safe in certain situations. Cimzia may be excreted in breast milk.
Hydroxyzine Pregnancy And Lactation Text: This medication is not safe during pregnancy and should not be taken. It is also excreted in breast milk and breast feeding isn't recommended.
Metronidazole Pregnancy And Lactation Text: This medication is Pregnancy Category B and considered safe during pregnancy.  It is also excreted in breast milk.
Topical Ketoconazole Pregnancy And Lactation Text: This medication is Pregnancy Category B and is considered safe during pregnancy. It is unknown if it is excreted in breast milk.
Bexarotene Pregnancy And Lactation Text: This medication is Pregnancy Category X and should not be given to women who are pregnant or may become pregnant. This medication should not be used if you are breast feeding.
Cephalexin Counseling: I counseled the patient regarding use of cephalexin as an antibiotic for prophylactic and/or therapeutic purposes. Cephalexin (commonly prescribed under brand name Keflex) is a cephalosporin antibiotic which is active against numerous classes of bacteria, including most skin bacteria. Side effects may include nausea, diarrhea, gastrointestinal upset, rash, hives, yeast infections, and in rare cases, hepatitis, kidney disease, seizures, fever, confusion, neurologic symptoms, and others. Patients with severe allergies to penicillin medications are cautioned that there is about a 10% incidence of cross-reactivity with cephalosporins. When possible, patients with penicillin allergies should use alternatives to cephalosporins for antibiotic therapy.
Solaraze Counseling:  I discussed with the patient the risks of Solaraze including but not limited to erythema, scaling, itching, weeping, crusting, and pain.
Ilumya Counseling: I discussed with the patient the risks of tildrakizumab including but not limited to immunosuppression, malignancy, posterior leukoencephalopathy syndrome, and serious infections.  The patient understands that monitoring is required including a PPD at baseline and must alert us or the primary physician if symptoms of infection or other concerning signs are noted.
Low Dose Naltrexone Counseling- I discussed with the patient the potential risks and side effects of low dose naltrexone including but not limited to: more vivid dreams, headaches, nausea, vomiting, abdominal pain, fatigue, dizziness, and anxiety.
Eucrisa Counseling: Patient may experience a mild burning sensation during topical application. Eucrisa is not approved in children less than 3 months of age.
Methotrexate Counseling:  Patient counseled regarding adverse effects of methotrexate including but not limited to nausea, vomiting, abnormalities in liver function tests. Patients may develop mouth sores, rash, diarrhea, and abnormalities in blood counts. The patient understands that monitoring is required including LFT's and blood counts.  There is a rare possibility of scarring of the liver and lung problems that can occur when taking methotrexate. Persistent nausea, loss of appetite, pale stools, dark urine, cough, and shortness of breath should be reported immediately. Patient advised to discontinue methotrexate treatment at least three months before attempting to become pregnant.  I discussed the need for folate supplements while taking methotrexate.  These supplements can decrease side effects during methotrexate treatment. The patient verbalized understanding of the proper use and possible adverse effects of methotrexate.  All of the patient's questions and concerns were addressed.
Ketoconazole Pregnancy And Lactation Text: This medication is Pregnancy Category C and it isn't know if it is safe during pregnancy. It is also excreted in breast milk and breast feeding isn't recommended.
Oral Minoxidil Pregnancy And Lactation Text: This medication should only be used when clearly needed if you are pregnant, attempting to become pregnant or breast feeding.
Carac Pregnancy And Lactation Text: This medication is Pregnancy Category X and contraindicated in pregnancy and in women who may become pregnant. It is unknown if this medication is excreted in breast milk.
Opioid Counseling: I discussed with the patient the potential side effects of opioids including but not limited to addiction, altered mental status, and depression. I stressed avoiding alcohol, benzodiazepines, muscle relaxants and sleep aids unless specifically okayed by a physician. The patient verbalized understanding of the proper use and possible adverse effects of opioids. All of the patient's questions and concerns were addressed. They were instructed to flush the remaining pills down the toilet if they did not need them for pain.
Thalidomide Counseling: I discussed with the patient the risks of thalidomide including but not limited to birth defects, anxiety, weakness, chest pain, dizziness, cough and severe allergy.
Tremfya Counseling: I discussed with the patient the risks of guselkumab including but not limited to immunosuppression, serious infections, and drug reactions.  The patient understands that monitoring is required including a PPD at baseline and must alert us or the primary physician if symptoms of infection or other concerning signs are noted.
Rinvoq Counseling: I discussed with the patient the risks of Rinvoq therapy including but not limited to upper respiratory tract infections, shingles, cold sores, bronchitis, nausea, cough, fever, acne, and headache. Live vaccines should be avoided.  This medication has been linked to serious infections; higher rate of mortality; malignancy and lymphoproliferative disorders; major adverse cardiovascular events; thrombosis; thrombocytopenia, anemia, and neutropenia; lipid elevations; liver enzyme elevations; and gastrointestinal perforations.
Dapsone Pregnancy And Lactation Text: This medication is Pregnancy Category C and is not considered safe during pregnancy or breast feeding.
Minocycline Counseling: Patient advised regarding possible photosensitivity and discoloration of the teeth, skin, lips, tongue and gums.  Patient instructed to avoid sunlight, if possible.  When exposed to sunlight, patients should wear protective clothing, sunglasses, and sunscreen.  The patient was instructed to call the office immediately if the following severe adverse effects occur:  hearing changes, easy bruising/bleeding, severe headache, or vision changes.  The patient verbalized understanding of the proper use and possible adverse effects of minocycline.  All of the patient's questions and concerns were addressed.
Bexarotene Counseling:  I discussed with the patient the risks of bexarotene including but not limited to hair loss, dry lips/skin/eyes, liver abnormalities, hyperlipidemia, pancreatitis, depression/suicidal ideation, photosensitivity, drug rash/allergic reactions, hypothyroidism, anemia, leukopenia, infection, cataracts, and teratogenicity.  Patient understands that they will need regular blood tests to check lipid profile, liver function tests, white blood cell count, thyroid function tests and pregnancy test if applicable.
Detail Level: Detailed
Spironolactone Pregnancy And Lactation Text: This medication can cause feminization of the male fetus and should be avoided during pregnancy. The active metabolite is also found in breast milk.
Solaraze Pregnancy And Lactation Text: This medication is Pregnancy Category B and is considered safe. There is some data to suggest avoiding during the third trimester. It is unknown if this medication is excreted in breast milk.
Cosentyx Counseling:  I discussed with the patient the risks of Cosentyx including but not limited to worsening of Crohn's disease, immunosuppression, allergic reactions and infections.  The patient understands that monitoring is required including a PPD at baseline and must alert us or the primary physician if symptoms of infection or other concerning signs are noted.
Topical Sulfur Applications Counseling: Topical Sulfur Counseling: Patient counseled that this medication may cause skin irritation or allergic reactions.  In the event of skin irritation, the patient was advised to reduce the amount of the drug applied or use it less frequently.   The patient verbalized understanding of the proper use and possible adverse effects of topical sulfur application.  All of the patient's questions and concerns were addressed.
Eucrisa Pregnancy And Lactation Text: This medication has not been assigned a Pregnancy Risk Category but animal studies failed to show danger with the topical medication. It is unknown if the medication is excreted in breast milk.
Terbinafine Counseling: Patient counseling regarding adverse effects of terbinafine including but not limited to headache, diarrhea, rash, upset stomach, liver function test abnormalities, itching, taste/smell disturbance, nausea, abdominal pain, and flatulence.  There is a rare possibility of liver failure that can occur when taking terbinafine.  The patient understands that a baseline LFT and kidney function test may be required. The patient verbalized understanding of the proper use and possible adverse effects of terbinafine.  All of the patient's questions and concerns were addressed.
Opzelura Counseling:  I discussed with the patient the risks of Opzelura including but not limited to nasopharngitis, bronchitis, ear infection, eosinophila, hives, diarrhea, folliculitis, tonsillitis, and rhinorrhea.  Taken orally, this medication has been linked to serious infections; higher rate of mortality; malignancy and lymphoproliferative disorders; major adverse cardiovascular events; thrombosis; thrombocytopenia, anemia, and neutropenia; and lipid elevations.
Cyclosporine Pregnancy And Lactation Text: This medication is Pregnancy Category C and it isn't know if it is safe during pregnancy. This medication is excreted in breast milk.
Cephalexin Pregnancy And Lactation Text: This medication is Pregnancy Category B and considered safe during pregnancy.  It is also excreted in breast milk but can be used safely for shorter doses.
Odomzo Counseling- I discussed with the patient the risks of Odomzo including but not limited to nausea, vomiting, diarrhea, constipation, weight loss, changes in the sense of taste, decreased appetite, muscle spasms, and hair loss.  The patient verbalized understanding of the proper use and possible adverse effects of Odomzo.  All of the patient's questions and concerns were addressed.
Oral Minoxidil Counseling- I discussed with the patient the risks of oral minoxidil including but not limited to shortness of breath, swelling of the feet or ankles, dizziness, lightheadedness, unwanted hair growth and allergic reaction.  The patient verbalized understanding of the proper use and possible adverse effects of oral minoxidil.  All of the patient's questions and concerns were addressed.
Calcipotriene Counseling:  I discussed with the patient the risks of calcipotriene including but not limited to erythema, scaling, itching, and irritation.
Opioid Pregnancy And Lactation Text: These medications can lead to premature delivery and should be avoided during pregnancy. These medications are also present in breast milk in small amounts.
Sski Pregnancy And Lactation Text: This medication is Pregnancy Category D and isn't considered safe during pregnancy. It is excreted in breast milk.
Albendazole Counseling:  I discussed with the patient the risks of albendazole including but not limited to cytopenia, kidney damage, nausea/vomiting and severe allergy.  The patient understands that this medication is being used in an off-label manner.
Siliq Counseling:  I discussed with the patient the risks of Siliq including but not limited to new or worsening depression, suicidal thoughts and behavior, immunosuppression, malignancy, posterior leukoencephalopathy syndrome, and serious infections.  The patient understands that monitoring is required including a PPD at baseline and must alert us or the primary physician if symptoms of infection or other concerning signs are noted. There is also a special program designed to monitor depression which is required with Siliq.
Gabapentin Counseling: I discussed with the patient the risks of gabapentin including but not limited to dizziness, somnolence, fatigue and ataxia.
Acitretin Pregnancy And Lactation Text: This medication is Pregnancy Category X and should not be given to women who are pregnant or may become pregnant in the future. This medication is excreted in breast milk.
Olumiant Pregnancy And Lactation Text: Based on animal studies, Olumiant may cause embryo-fetal harm when administered to pregnant women.  The medication should not be used in pregnancy.  Breastfeeding is not recommended during treatment.
Topical Sulfur Applications Pregnancy And Lactation Text: This medication is considered safe during pregnancy and breast feeding secondary to limited systemic absorption.
Spironolactone Counseling: Patient advised regarding risks of diarrhea, abdominal pain, hyperkalemia, birth defects (for female patients), liver toxicity and renal toxicity. The patient may need blood work to monitor liver and kidney function and potassium levels while on therapy. The patient verbalized understanding of the proper use and possible adverse effects of spironolactone.  All of the patient's questions and concerns were addressed.
Infliximab Counseling:  I discussed with the patient the risks of infliximab including but not limited to myelosuppression, immunosuppression, autoimmune hepatitis, demyelinating diseases, lymphoma, and serious infections.  The patient understands that monitoring is required including a PPD at baseline and must alert us or the primary physician if symptoms of infection or other concerning signs are noted.
Opzelura Pregnancy And Lactation Text: There is insufficient data to evaluate drug-associated risk for major birth defects, miscarriage, or other adverse maternal or fetal outcomes.  There is a pregnancy registry that monitors pregnancy outcomes in pregnant persons exposed to the medication during pregnancy.  It is unknown if this medication is excreted in breast milk.  Do not breastfeed during treatment and for about 4 weeks after the last dose.
Olanzapine Pregnancy And Lactation Text: This medication is pregnancy category C.   There are no adequate and well controlled trials with olanzapine in pregnant females.  Olanzapine should be used during pregnancy only if the potential benefit justifies the potential risk to the fetus.   In a study in lactating healthy women, olanzapine was excreted in breast milk.  It is recommended that women taking olanzapine should not breast feed.
Topical Retinoid counseling:  Patient advised to apply a pea-sized amount only at bedtime and wait 30 minutes after washing their face before applying.  If too drying, patient may add a non-comedogenic moisturizer. The patient verbalized understanding of the proper use and possible adverse effects of retinoids.  All of the patient's questions and concerns were addressed.
Clindamycin Counseling: I counseled the patient regarding use of clindamycin as an antibiotic for prophylactic and/or therapeutic purposes. Clindamycin is active against numerous classes of bacteria, including skin bacteria. Side effects may include nausea, diarrhea, gastrointestinal upset, rash, hives, yeast infections, and in rare cases, colitis.
Libtayo Pregnancy And Lactation Text: This medication is contraindicated in pregnancy and when breast feeding.
Hydroquinone Counseling:  Patient advised that medication may result in skin irritation, lightening (hypopigmentation), dryness, and burning.  In the event of skin irritation, the patient was advised to reduce the amount of the drug applied or use it less frequently.  Rarely, spots that are treated with hydroquinone can become darker (pseudoochronosis).  Should this occur, patient instructed to stop medication and call the office. The patient verbalized understanding of the proper use and possible adverse effects of hydroquinone.  All of the patient's questions and concerns were addressed.
Fluconazole Counseling:  Patient counseled regarding adverse effects of fluconazole including but not limited to headache, diarrhea, nausea, upset stomach, liver function test abnormalities, taste disturbance, and stomach pain.  There is a rare possibility of liver failure that can occur when taking fluconazole.  The patient understands that monitoring of LFTs and kidney function test may be required, especially at baseline. The patient verbalized understanding of the proper use and possible adverse effects of fluconazole.  All of the patient's questions and concerns were addressed.
Terbinafine Pregnancy And Lactation Text: This medication is Pregnancy Category B and is considered safe during pregnancy. It is also excreted in breast milk and breast feeding isn't recommended.
Cyclosporine Counseling:  I discussed with the patient the risks of cyclosporine including but not limited to hypertension, gingival hyperplasia,myelosuppression, immunosuppression, liver damage, kidney damage, neurotoxicity, lymphoma, and serious infections. The patient understands that monitoring is required including baseline blood pressure, CBC, CMP, lipid panel and uric acid, and then 1-2 times monthly CMP and blood pressure.
SSKI Counseling:  I discussed with the patient the risks of SSKI including but not limited to thyroid abnormalities, metallic taste, GI upset, fever, headache, acne, arthralgias, paraesthesias, lymphadenopathy, easy bleeding, arrhythmias, and allergic reaction.
Albendazole Pregnancy And Lactation Text: This medication is Pregnancy Category C and it isn't known if it is safe during pregnancy. It is also excreted in breast milk.
Calcipotriene Pregnancy And Lactation Text: This medication has not been proven safe during pregnancy. It is unknown if this medication is excreted in breast milk.
Taltz Counseling: I discussed with the patient the risks of ixekizumab including but not limited to immunosuppression, serious infections, worsening of inflammatory bowel disease and drug reactions.  The patient understands that monitoring is required including a PPD at baseline and must alert us or the primary physician if symptoms of infection or other concerning signs are noted.
Acitretin Counseling:  I discussed with the patient the risks of acitretin including but not limited to hair loss, dry lips/skin/eyes, liver damage, hyperlipidemia, depression/suicidal ideation, photosensitivity.  Serious rare side effects can include but are not limited to pancreatitis, pseudotumor cerebri, bony changes, clot formation/stroke/heart attack.  Patient understands that alcohol is contraindicated since it can result in liver toxicity and significantly prolong the elimination of the drug by many years.
Arava Counseling:  Patient counseled regarding adverse effects of Arava including but not limited to nausea, vomiting, abnormalities in liver function tests. Patients may develop mouth sores, rash, diarrhea, and abnormalities in blood counts. The patient understands that monitoring is required including LFTs and blood counts.  There is a rare possibility of scarring of the liver and lung problems that can occur when taking methotrexate. Persistent nausea, loss of appetite, pale stools, dark urine, cough, and shortness of breath should be reported immediately. Patient advised to discontinue Arava treatment and consult with a physician prior to attempting conception. The patient will have to undergo a treatment to eliminate Arava from the body prior to conception.
Olumiant Counseling: I discussed with the patient the risks of Olumiant therapy including but not limited to upper respiratory tract infections, shingles, cold sores, and nausea. Live vaccines should be avoided.  This medication has been linked to serious infections; higher rate of mortality; malignancy and lymphoproliferative disorders; major adverse cardiovascular events; thrombosis; gastrointestinal perforations; neutropenia; lymphopenia; anemia; liver enzyme elevations; and lipid elevations.
Dupixent Counseling: I discussed with the patient the risks of dupilumab including but not limited to eye infection and irritation, cold sores, injection site reactions, worsening of asthma, allergic reactions and increased risk of parasitic infection.  Live vaccines should be avoided while taking dupilumab. Dupilumab will also interact with certain medications such as warfarin and cyclosporine. The patient understands that monitoring is required and they must alert us or the primary physician if symptoms of infection or other concerning signs are noted.
Birth Control Pills Pregnancy And Lactation Text: This medication should be avoided if pregnant and for the first 30 days post-partum.
Wartpeel Counseling:  I discussed with the patient the risks of Wartpeel including but not limited to erythema, scaling, itching, weeping, crusting, and pain.
Quinolones Counseling:  I discussed with the patient the risks of fluoroquinolones including but not limited to GI upset, allergic reaction, drug rash, diarrhea, dizziness, photosensitivity, yeast infections, liver function test abnormalities, tendonitis/tendon rupture.
Picato Counseling:  I discussed with the patient the risks of Picato including but not limited to erythema, scaling, itching, weeping, crusting, and pain.
Olanzapine Counseling- I discussed with the patient the common side effects of olanzapine including but are not limited to: lack of energy, dry mouth, increased appetite, sleepiness, tremor, constipation, dizziness, changes in behavior, or restlessness.  Explained that teenagers are more likely to experience headaches, abdominal pain, pain in the arms or legs, tiredness, and sleepiness.  Serious side effects include but are not limited: increased risk of death in elderly patients who are confused, have memory loss, or dementia-related psychosis; hyperglycemia; increased cholesterol and triglycerides; and weight gain.
Azelaic Acid Pregnancy And Lactation Text: This medication is considered safe during pregnancy and breast feeding.
Clindamycin Pregnancy And Lactation Text: This medication can be used in pregnancy if certain situations. Clindamycin is also present in breast milk.
Libtayo Counseling- I discussed with the patient the risks of Libtayo including but not limited to nausea, vomiting, diarrhea, and bone or muscle pain.  The patient verbalized understanding of the proper use and possible adverse effects of Libtayo.  All of the patient's questions and concerns were addressed.
Fluconazole Pregnancy And Lactation Text: This medication is Pregnancy Category C and it isn't know if it is safe during pregnancy. It is also excreted in breast milk.
Cyclophosphamide Pregnancy And Lactation Text: This medication is Pregnancy Category D and it isn't considered safe during pregnancy. This medication is excreted in breast milk.
Ivermectin Counseling:  Patient instructed to take medication on an empty stomach with a full glass of water.  Patient informed of potential adverse effects including but not limited to nausea, diarrhea, dizziness, itching, and swelling of the extremities or lymph nodes.  The patient verbalized understanding of the proper use and possible adverse effects of ivermectin.  All of the patient's questions and concerns were addressed.
Propranolol Pregnancy And Lactation Text: This medication is Pregnancy Category C and it isn't known if it is safe during pregnancy. It is excreted in breast milk.
Azelaic Acid Counseling: Patient counseled that medicine may cause skin irritation and to avoid applying near the eyes.  In the event of skin irritation, the patient was advised to reduce the amount of the drug applied or use it less frequently.   The patient verbalized understanding of the proper use and possible adverse effects of azelaic acid.  All of the patient's questions and concerns were addressed.
5-Fu Counseling: 5-Fluorouracil Counseling:  I discussed with the patient the risks of 5-fluorouracil including but not limited to erythema, scaling, itching, weeping, crusting, and pain.
Cibinqo Pregnancy And Lactation Text: It is unknown if this medication will adversely affect pregnancy or breast feeding.  You should not take this medication if you are currently pregnant or planning a pregnancy or while breastfeeding.
Glycopyrrolate Counseling:  I discussed with the patient the risks of glycopyrrolate including but not limited to skin rash, drowsiness, dry mouth, difficulty urinating, and blurred vision.
Cimetidine Counseling:  I discussed with the patient the risks of Cimetidine including but not limited to gynecomastia, headache, diarrhea, nausea, drowsiness, arrhythmias, pancreatitis, skin rashes, psychosis, bone marrow suppression and kidney toxicity.
Birth Control Pills Counseling: Birth Control Pill Counseling: I discussed with the patient the potential side effects of OCPs including but not limited to increased risk of stroke, heart attack, thrombophlebitis, deep venous thrombosis, hepatic adenomas, breast changes, GI upset, headaches, and depression.  The patient verbalized understanding of the proper use and possible adverse effects of OCPs. All of the patient's questions and concerns were addressed.
Tazorac Counseling:  Patient advised that medication is irritating and drying.  Patient may need to apply sparingly and wash off after an hour before eventually leaving it on overnight.  The patient verbalized understanding of the proper use and possible adverse effects of tazorac.  All of the patient's questions and concerns were addressed.
Doxycycline Counseling:  Patient counseled regarding possible photosensitivity and increased risk for sunburn.  Patient instructed to avoid sunlight, if possible.  When exposed to sunlight, patients should wear protective clothing, sunglasses, and sunscreen.  The patient was instructed to call the office immediately if the following severe adverse effects occur:  hearing changes, easy bruising/bleeding, severe headache, or vision changes.  The patient verbalized understanding of the proper use and possible adverse effects of doxycycline.  All of the patient's questions and concerns were addressed.
Rituxan Counseling:  I discussed with the patient the risks of Rituxan infusions. Side effects can include infusion reactions, severe drug rashes including mucocutaneous reactions, reactivation of latent hepatitis and other infections and rarely progressive multifocal leukoencephalopathy.  All of the patient's questions and concerns were addressed.
Dupixent Pregnancy And Lactation Text: This medication likely crosses the placenta but the risk for the fetus is uncertain. This medication is excreted in breast milk.
Nsaids Pregnancy And Lactation Text: These medications are considered safe up to 30 weeks gestation. It is excreted in breast milk.
Imiquimod Counseling:  I discussed with the patient the risks of imiquimod including but not limited to erythema, scaling, itching, weeping, crusting, and pain.  Patient understands that the inflammatory response to imiquimod is variable from person to person and was educated regarded proper titration schedule.  If flu-like symptoms develop, patient knows to discontinue the medication and contact us.
Griseofulvin Counseling:  I discussed with the patient the risks of griseofulvin including but not limited to photosensitivity, cytopenia, liver damage, nausea/vomiting and severe allergy.  The patient understands that this medication is best absorbed when taken with a fatty meal (e.g., ice cream or french fries).
Cyclophosphamide Counseling:  I discussed with the patient the risks of cyclophosphamide including but not limited to hair loss, hormonal abnormalities, decreased fertility, abdominal pain, diarrhea, nausea and vomiting, bone marrow suppression and infection. The patient understands that monitoring is required while taking this medication.
Benzoyl Peroxide Counseling: Patient counseled that medicine may cause skin irritation and bleach clothing.  In the event of skin irritation, the patient was advised to reduce the amount of the drug applied or use it less frequently.   The patient verbalized understanding of the proper use and possible adverse effects of benzoyl peroxide.  All of the patient's questions and concerns were addressed.
Propranolol Counseling:  I discussed with the patient the risks of propranolol including but not limited to low heart rate, low blood pressure, low blood sugar, restlessness and increased cold sensitivity. They should call the office if they experience any of these side effects.
Stelara Counseling:  I discussed with the patient the risks of ustekinumab including but not limited to immunosuppression, malignancy, posterior leukoencephalopathy syndrome, and serious infections.  The patient understands that monitoring is required including a PPD at baseline and must alert us or the primary physician if symptoms of infection or other concerning signs are noted.
Glycopyrrolate Pregnancy And Lactation Text: This medication is Pregnancy Category B and is considered safe during pregnancy. It is unknown if it is excreted breast milk.
Valtrex Pregnancy And Lactation Text: this medication is Pregnancy Category B and is considered safe during pregnancy. This medication is not directly found in breast milk but it's metabolite acyclovir is present.
High Dose Vitamin A Pregnancy And Lactation Text: High dose vitamin A therapy is contraindicated during pregnancy and breast feeding.
Rifampin Counseling: I discussed with the patient the risks of rifampin including but not limited to liver damage, kidney damage, red-orange body fluids, nausea/vomiting and severe allergy.
Winlevi Counseling:  I discussed with the patient the risks of topical clascoterone including but not limited to erythema, scaling, itching, and stinging. Patient voiced their understanding.
Xelbessyz Pregnancy And Lactation Text: This medication is Pregnancy Category D and is not considered safe during pregnancy.  The risk during breast feeding is also uncertain.
Cibinqo Counseling: I discussed with the patient the risks of Cibinqo therapy including but not limited to common cold, nausea, headache, cold sores, increased blood CPK levels, dizziness, UTIs, fatigue, acne, and vomitting. Live vaccines should be avoided.  This medication has been linked to serious infections; higher rate of mortality; malignancy and lymphoproliferative disorders; major adverse cardiovascular events; thrombosis; thrombocytopenia and lymphopenia; lipid elevations; and retinal detachment.
Clofazimine Counseling:  I discussed with the patient the risks of clofazimine including but not limited to skin and eye pigmentation, liver damage, nausea/vomiting, gastrointestinal bleeding and allergy.
Finasteride Pregnancy And Lactation Text: This medication is absolutely contraindicated during pregnancy. It is unknown if it is excreted in breast milk.
Tazorac Pregnancy And Lactation Text: This medication is not safe during pregnancy. It is unknown if this medication is excreted in breast milk.
Rituxan Pregnancy And Lactation Text: This medication is Pregnancy Category C and it isn't know if it is safe during pregnancy. It is unknown if this medication is excreted in breast milk but similar antibodies are known to be excreted.
Doxycycline Pregnancy And Lactation Text: This medication is Pregnancy Category D and not consider safe during pregnancy. It is also excreted in breast milk but is considered safe for shorter treatment courses.
Enbrel Counseling:  I discussed with the patient the risks of etanercept including but not limited to myelosuppression, immunosuppression, autoimmune hepatitis, demyelinating diseases, lymphoma, and infections.  The patient understands that monitoring is required including a PPD at baseline and must alert us or the primary physician if symptoms of infection or other concerning signs are noted.
Protopic Counseling: Patient may experience a mild burning sensation during topical application. Protopic is not approved in children less than 2 years of age. There have been case reports of hematologic and skin malignancies in patients using topical calcineurin inhibitors although causality is questionable.
Azithromycin Counseling:  I discussed with the patient the risks of azithromycin including but not limited to GI upset, allergic reaction, drug rash, diarrhea, and yeast infections.
Nsaids Counseling: NSAID Counseling: I discussed with the patient that NSAIDs should be taken with food. Prolonged use of NSAIDs can result in the development of stomach ulcers.  Patient advised to stop taking NSAIDs if abdominal pain occurs.  The patient verbalized understanding of the proper use and possible adverse effects of NSAIDs.  All of the patient's questions and concerns were addressed.
Drysol Counseling:  I discussed with the patient the risks of drysol/aluminum chloride including but not limited to skin rash, itching, irritation, burning.
Erivedge Counseling- I discussed with the patient the risks of Erivedge including but not limited to nausea, vomiting, diarrhea, constipation, weight loss, changes in the sense of taste, decreased appetite, muscle spasms, and hair loss.  The patient verbalized understanding of the proper use and possible adverse effects of Erivedge.  All of the patient's questions and concerns were addressed.
Griseofulvin Pregnancy And Lactation Text: This medication is Pregnancy Category X and is known to cause serious birth defects. It is unknown if this medication is excreted in breast milk but breast feeding should be avoided.
Hydroxychloroquine Counseling:  I discussed with the patient that a baseline ophthalmologic exam is needed at the start of therapy and every year thereafter while on therapy. A CBC may also be warranted for monitoring.  The side effects of this medication were discussed with the patient, including but not limited to agranulocytosis, aplastic anemia, seizures, rashes, retinopathy, and liver toxicity. Patient instructed to call the office should any adverse effect occur.  The patient verbalized understanding of the proper use and possible adverse effects of Plaquenil.  All the patient's questions and concerns were addressed.
Valtrex Counseling: I discussed with the patient the risks of valacyclovir including but not limited to kidney damage, nausea, vomiting and severe allergy.  The patient understands that if the infection seems to be worsening or is not improving, they are to call.
Rifampin Pregnancy And Lactation Text: This medication is Pregnancy Category C and it isn't know if it is safe during pregnancy. It is also excreted in breast milk and should not be used if you are breast feeding.
Xeljanz Counseling: I discussed with the patient the risks of Xeljanz therapy including increased risk of infection, liver issues, headache, diarrhea, or cold symptoms. Live vaccines should be avoided. They were instructed to call if they have any problems.
Doxepin Counseling:  Patient advised that the medication is sedating and not to drive a car after taking this medication. Patient informed of potential adverse effects including but not limited to dry mouth, urinary retention, and blurry vision.  The patient verbalized understanding of the proper use and possible adverse effects of doxepin.  All of the patient's questions and concerns were addressed.
Adbry Counseling: I discussed with the patient the risks of tralokinumab including but not limited to eye infection and irritation, cold sores, injection site reactions, worsening of asthma, allergic reactions and increased risk of parasitic infection.  Live vaccines should be avoided while taking tralokinumab. The patient understands that monitoring is required and they must alert us or the primary physician if symptoms of infection or other concerning signs are noted.
Finasteride Male Counseling: Finasteride Counseling:  I discussed with the patient the risks of use of finasteride including but not limited to decreased libido, decreased ejaculate volume, gynecomastia, and depression. Women should not handle medication.  All of the patient's questions and concerns were addressed.
Winlevi Pregnancy And Lactation Text: This medication is considered safe during pregnancy and breastfeeding.
High Dose Vitamin A Counseling: Side effects reviewed, pt to contact office should one occur.
Protopic Pregnancy And Lactation Text: This medication is Pregnancy Category C. It is unknown if this medication is excreted in breast milk when applied topically.
Azithromycin Pregnancy And Lactation Text: This medication is considered safe during pregnancy and is also secreted in breast milk.
Niacinamide Pregnancy And Lactation Text: These medications are considered safe during pregnancy.
Topical Clindamycin Counseling: Patient counseled that this medication may cause skin irritation or allergic reactions.  In the event of skin irritation, the patient was advised to reduce the amount of the drug applied or use it less frequently.   The patient verbalized understanding of the proper use and possible adverse effects of clindamycin.  All of the patient's questions and concerns were addressed.
Erythromycin Counseling:  I discussed with the patient the risks of erythromycin including but not limited to GI upset, allergic reaction, drug rash, diarrhea, increase in liver enzymes, and yeast infections.
Itraconazole Counseling:  I discussed with the patient the risks of itraconazole including but not limited to liver damage, nausea/vomiting, neuropathy, and severe allergy.  The patient understands that this medication is best absorbed when taken with acidic beverages such as non-diet cola or ginger ale.  The patient understands that monitoring is required including baseline LFTs and repeat LFTs at intervals.  The patient understands that they are to contact us or the primary physician if concerning signs are noted.
Minoxidil Counseling: Minoxidil is a topical medication which can increase blood flow where it is applied. It is uncertain how this medication increases hair growth. Side effects are uncommon and include stinging and allergic reactions.
Cellcept Counseling:  I discussed with the patient the risks of mycophenolate mofetil including but not limited to infection/immunosuppression, GI upset, hypokalemia, hypercholesterolemia, bone marrow suppression, lymphoproliferative disorders, malignancy, GI ulceration/bleed/perforation, colitis, interstitial lung disease, kidney failure, progressive multifocal leukoencephalopathy, and birth defects.  The patient understands that monitoring is required including a baseline creatinine and regular CBC testing. In addition, patient must alert us immediately if symptoms of infection or other concerning signs are noted.
Oxybutynin Counseling:  I discussed with the patient the risks of oxybutynin including but not limited to skin rash, drowsiness, dry mouth, difficulty urinating, and blurred vision.
Tranexamic Acid Pregnancy And Lactation Text: It is unknown if this medication is safe during pregnancy or breast feeding.
Xolair Pregnancy And Lactation Text: This medication is Pregnancy Category B and is considered safe during pregnancy. This medication is excreted in breast milk.
Skyrizi Counseling: I discussed with the patient the risks of risankizumab-rzaa including but not limited to immunosuppression, and serious infections.  The patient understands that monitoring is required including a PPD at baseline and must alert us or the primary physician if symptoms of infection or other concerning signs are noted.
Colchicine Counseling:  Patient counseled regarding adverse effects including but not limited to stomach upset (nausea, vomiting, stomach pain, or diarrhea).  Patient instructed to limit alcohol consumption while taking this medication.  Colchicine may reduce blood counts especially with prolonged use.  The patient understands that monitoring of kidney function and blood counts may be required, especially at baseline. The patient verbalized understanding of the proper use and possible adverse effects of colchicine.  All of the patient's questions and concerns were addressed.
Doxepin Pregnancy And Lactation Text: This medication is Pregnancy Category C and it isn't known if it is safe during pregnancy. It is also excreted in breast milk and breast feeding isn't recommended.
Hydroxychloroquine Pregnancy And Lactation Text: This medication has been shown to cause fetal harm but it isn't assigned a Pregnancy Risk Category. There are small amounts excreted in breast milk.
Dutasteride Pregnancy And Lactation Text: This medication is absolutely contraindicated in women, especially during pregnancy and breast feeding. Feminization of male fetuses is possible if taking while pregnant.
Isotretinoin Pregnancy And Lactation Text: This medication is Pregnancy Category X and is considered extremely dangerous during pregnancy. It is unknown if it is excreted in breast milk.
Sarecycline Counseling: Patient advised regarding possible photosensitivity and discoloration of the teeth, skin, lips, tongue and gums.  Patient instructed to avoid sunlight, if possible.  When exposed to sunlight, patients should wear protective clothing, sunglasses, and sunscreen.  The patient was instructed to call the office immediately if the following severe adverse effects occur:  hearing changes, easy bruising/bleeding, severe headache, or vision changes.  The patient verbalized understanding of the proper use and possible adverse effects of sarecycline.  All of the patient's questions and concerns were addressed.
Sotyktu Pregnancy And Lactation Text: There is insufficient data to evaluate whether or not Sotyktu is safe to use during pregnancy.   It is not known if Sotyktu passes into breast milk and whether or not it is safe to use when breastfeeding.  
Zyclara Counseling:  I discussed with the patient the risks of imiquimod including but not limited to erythema, scaling, itching, weeping, crusting, and pain.  Patient understands that the inflammatory response to imiquimod is variable from person to person and was educated regarded proper titration schedule.  If flu-like symptoms develop, patient knows to discontinue the medication and contact us.
Bactrim Counseling:  I discussed with the patient the risks of sulfa antibiotics including but not limited to GI upset, allergic reaction, drug rash, diarrhea, dizziness, photosensitivity, and yeast infections.  Rarely, more serious reactions can occur including but not limited to aplastic anemia, agranulocytosis, methemoglobinemia, blood dyscrasias, liver or kidney failure, lung infiltrates or desquamative/blistering drug rashes.
Humira Counseling:  I discussed with the patient the risks of adalimumab including but not limited to myelosuppression, immunosuppression, autoimmune hepatitis, demyelinating diseases, lymphoma, and serious infections.  The patient understands that monitoring is required including a PPD at baseline and must alert us or the primary physician if symptoms of infection or other concerning signs are noted.
Niacinamide Counseling: I recommended taking niacin or niacinamide, also know as vitamin B3, twice daily. Recent evidence suggests that taking vitamin B3 (500 mg twice daily) can reduce the risk of actinic keratoses and non-melanoma skin cancers. Side effects of vitamin B3 include flushing and headache.
Erythromycin Pregnancy And Lactation Text: This medication is Pregnancy Category B and is considered safe during pregnancy. It is also excreted in breast milk.
Adbry Pregnancy And Lactation Text: It is unknown if this medication will adversely affect pregnancy or breast feeding.
Rhofade Counseling: Rhofade is a topical medication which can decrease superficial blood flow where applied. Side effects are uncommon and include stinging, redness and allergic reactions.
Otezla Pregnancy And Lactation Text: This medication is Pregnancy Category C and it isn't known if it is safe during pregnancy. It is unknown if it is excreted in breast milk.

## 2023-12-18 NOTE — PROCEDURE: ISOTRETINOIN INITIATION
Initial Beta Hcg (Optional): negative (12/18/23)
Anticipated Starting Dosage (Optional): 40mg Daily
Detail Level: Zone
Ipledge Number (Optional): [id number]

## 2023-12-18 NOTE — PROCEDURE: ORDER TESTS
Performing Laboratory: [id number]
Billing Type: Third-Party Bill
Expected Date Of Service: 12/18/2023
Lab Facility: [id number]
Bill For Surgical Tray: no

## 2023-12-18 NOTE — PROCEDURE: PHOTO-DOCUMENTATION
Detail Level: Zone
Details (Free Text): Negative pregnancy test.
Photo Preface (Leave Blank If You Do Not Want): Photographs were obtained today

## 2023-12-18 NOTE — PROCEDURE: URINE PREGNANCY TEST
Performed By: ONEL
Urine Pregnancy Test Result: negative
Lot # (Optional): [id number]
Expiration Date (Optional): 3-31-24
Detail Level: None

## 2023-12-18 NOTE — PROCEDURE: COUNSELING
Aklief Pregnancy And Lactation Text: It is unknown if this medication is safe to use during pregnancy.  It is unknown if this medication is excreted in breast milk.  Breastfeeding women should use the topical cream on the smallest area of the skin for the shortest time needed while breastfeeding.  Do not apply to nipple and areola.
Tazorac Counseling:  Patient advised that medication is irritating and drying.  Patient may need to apply sparingly and wash off after an hour before eventually leaving it on overnight.  The patient verbalized understanding of the proper use and possible adverse effects of tazorac.  All of the patient's questions and concerns were addressed.
Topical Clindamycin Counseling: Patient counseled that this medication may cause skin irritation or allergic reactions.  In the event of skin irritation, the patient was advised to reduce the amount of the drug applied or use it less frequently.   The patient verbalized understanding of the proper use and possible adverse effects of clindamycin.  All of the patient's questions and concerns were addressed.
Minocycline Counseling: Patient advised regarding possible photosensitivity and discoloration of the teeth, skin, lips, tongue and gums.  Patient instructed to avoid sunlight, if possible.  When exposed to sunlight, patients should wear protective clothing, sunglasses, and sunscreen.  The patient was instructed to call the office immediately if the following severe adverse effects occur:  hearing changes, easy bruising/bleeding, severe headache, or vision changes.  The patient verbalized understanding of the proper use and possible adverse effects of minocycline.  All of the patient's questions and concerns were addressed.
Azithromycin Counseling:  I discussed with the patient the risks of azithromycin including but not limited to GI upset, allergic reaction, drug rash, diarrhea, and yeast infections.
Azelaic Acid Pregnancy And Lactation Text: This medication is considered safe during pregnancy and breast feeding.
Sarecycline Counseling: Patient advised regarding possible photosensitivity and discoloration of the teeth, skin, lips, tongue and gums.  Patient instructed to avoid sunlight, if possible.  When exposed to sunlight, patients should wear protective clothing, sunglasses, and sunscreen.  The patient was instructed to call the office immediately if the following severe adverse effects occur:  hearing changes, easy bruising/bleeding, severe headache, or vision changes.  The patient verbalized understanding of the proper use and possible adverse effects of sarecycline.  All of the patient's questions and concerns were addressed.
Erythromycin Pregnancy And Lactation Text: This medication is Pregnancy Category B and is considered safe during pregnancy. It is also excreted in breast milk.
Bactrim Counseling:  I discussed with the patient the risks of sulfa antibiotics including but not limited to GI upset, allergic reaction, drug rash, diarrhea, dizziness, photosensitivity, and yeast infections.  Rarely, more serious reactions can occur including but not limited to aplastic anemia, agranulocytosis, methemoglobinemia, blood dyscrasias, liver or kidney failure, lung infiltrates or desquamative/blistering drug rashes.
Doxycycline Pregnancy And Lactation Text: This medication is Pregnancy Category D and not consider safe during pregnancy. It is also excreted in breast milk but is considered safe for shorter treatment courses.
Topical Retinoid counseling:  Patient advised to apply a pea-sized amount only at bedtime and wait 30 minutes after washing their face before applying.  If too drying, patient may add a non-comedogenic moisturizer. The patient verbalized understanding of the proper use and possible adverse effects of retinoids.  All of the patient's questions and concerns were addressed.
Topical Sulfur Applications Pregnancy And Lactation Text: This medication is Pregnancy Category C and has an unknown safety profile during pregnancy. It is unknown if this topical medication is excreted in breast milk.
Spironolactone Pregnancy And Lactation Text: This medication can cause feminization of the male fetus and should be avoided during pregnancy. The active metabolite is also found in breast milk.
Use Enhanced Medication Counseling?: No
Benzoyl Peroxide Counseling: Patient counseled that medicine may cause skin irritation and bleach clothing.  In the event of skin irritation, the patient was advised to reduce the amount of the drug applied or use it less frequently.   The patient verbalized understanding of the proper use and possible adverse effects of benzoyl peroxide.  All of the patient's questions and concerns were addressed.
Isotretinoin Counseling: Patient should get monthly blood tests, not donate blood, not drive at night if vision affected, not share medication, and not undergo elective surgery for 6 months after tx completed. Side effects reviewed, pt to contact office should one occur.
Tetracycline Pregnancy And Lactation Text: This medication is Pregnancy Category D and not consider safe during pregnancy. It is also excreted in breast milk.
Winlevi Pregnancy And Lactation Text: This medication is considered safe during pregnancy and breastfeeding.
Dapsone Pregnancy And Lactation Text: This medication is Pregnancy Category C and is not considered safe during pregnancy or breast feeding.
High Dose Vitamin A Counseling: Side effects reviewed, pt to contact office should one occur.
Birth Control Pills Pregnancy And Lactation Text: This medication should be avoided if pregnant and for the first 30 days post-partum.
Detail Level: Detailed
Azelaic Acid Counseling: Patient counseled that medicine may cause skin irritation and to avoid applying near the eyes.  In the event of skin irritation, the patient was advised to reduce the amount of the drug applied or use it less frequently.   The patient verbalized understanding of the proper use and possible adverse effects of azelaic acid.  All of the patient's questions and concerns were addressed.
Tazorac Pregnancy And Lactation Text: This medication is not safe during pregnancy. It is unknown if this medication is excreted in breast milk.
Aklief counseling:  Patient advised to apply a pea-sized amount only at bedtime and wait 30 minutes after washing their face before applying.  If too drying, patient may add a non-comedogenic moisturizer.  The most commonly reported side effects including irritation, redness, scaling, dryness, stinging, burning, itching, and increased risk of sunburn.  The patient verbalized understanding of the proper use and possible adverse effects of retinoids.  All of the patient's questions and concerns were addressed.
Doxycycline Counseling:  Patient counseled regarding possible photosensitivity and increased risk for sunburn.  Patient instructed to avoid sunlight, if possible.  When exposed to sunlight, patients should wear protective clothing, sunglasses, and sunscreen.  The patient was instructed to call the office immediately if the following severe adverse effects occur:  hearing changes, easy bruising/bleeding, severe headache, or vision changes.  The patient verbalized understanding of the proper use and possible adverse effects of doxycycline.  All of the patient's questions and concerns were addressed.
Topical Clindamycin Pregnancy And Lactation Text: This medication is Pregnancy Category B and is considered safe during pregnancy. It is unknown if it is excreted in breast milk.
Bactrim Pregnancy And Lactation Text: This medication is Pregnancy Category D and is known to cause fetal risk.  It is also excreted in breast milk.
Erythromycin Counseling:  I discussed with the patient the risks of erythromycin including but not limited to GI upset, allergic reaction, drug rash, diarrhea, increase in liver enzymes, and yeast infections.
Azithromycin Pregnancy And Lactation Text: This medication is considered safe during pregnancy and is also secreted in breast milk.
Tetracycline Counseling: Patient counseled regarding possible photosensitivity and increased risk for sunburn.  Patient instructed to avoid sunlight, if possible.  When exposed to sunlight, patients should wear protective clothing, sunglasses, and sunscreen.  The patient was instructed to call the office immediately if the following severe adverse effects occur:  hearing changes, easy bruising/bleeding, severe headache, or vision changes.  The patient verbalized understanding of the proper use and possible adverse effects of tetracycline.  All of the patient's questions and concerns were addressed. Patient understands to avoid pregnancy while on therapy due to potential birth defects.
Topical Sulfur Applications Counseling: Topical Sulfur Counseling: Patient counseled that this medication may cause skin irritation or allergic reactions.  In the event of skin irritation, the patient was advised to reduce the amount of the drug applied or use it less frequently.   The patient verbalized understanding of the proper use and possible adverse effects of topical sulfur application.  All of the patient's questions and concerns were addressed.
Benzoyl Peroxide Pregnancy And Lactation Text: This medication is Pregnancy Category C. It is unknown if benzoyl peroxide is excreted in breast milk.
Winlevi Counseling:  I discussed with the patient the risks of topical clascoterone including but not limited to erythema, scaling, itching, and stinging. Patient voiced their understanding.
Spironolactone Counseling: Patient advised regarding risks of diarrhea, abdominal pain, hyperkalemia, birth defects (for female patients), liver toxicity and renal toxicity. The patient may need blood work to monitor liver and kidney function and potassium levels while on therapy. The patient verbalized understanding of the proper use and possible adverse effects of spironolactone.  All of the patient's questions and concerns were addressed.
Birth Control Pills Counseling: Birth Control Pill Counseling: I discussed with the patient the potential side effects of OCPs including but not limited to increased risk of stroke, heart attack, thrombophlebitis, deep venous thrombosis, hepatic adenomas, breast changes, GI upset, headaches, and depression.  The patient verbalized understanding of the proper use and possible adverse effects of OCPs. All of the patient's questions and concerns were addressed.
High Dose Vitamin A Pregnancy And Lactation Text: High dose vitamin A therapy is contraindicated during pregnancy and breast feeding.
Dapsone Counseling: I discussed with the patient the risks of dapsone including but not limited to hemolytic anemia, agranulocytosis, rashes, methemoglobinemia, kidney failure, peripheral neuropathy, headaches, GI upset, and liver toxicity.  Patients who start dapsone require monitoring including baseline LFTs and weekly CBCs for the first month, then every month thereafter.  The patient verbalized understanding of the proper use and possible adverse effects of dapsone.  All of the patient's questions and concerns were addressed.
Isotretinoin Pregnancy And Lactation Text: This medication is Pregnancy Category X and is considered extremely dangerous during pregnancy. It is unknown if it is excreted in breast milk.
Topical Retinoid Pregnancy And Lactation Text: This medication is Pregnancy Category C. It is unknown if this medication is excreted in breast milk.

## 2024-01-02 ENCOUNTER — TELEMEDICINE (OUTPATIENT)
Dept: PRIMARY CARE | Facility: CLINIC | Age: 19
End: 2024-01-02
Payer: COMMERCIAL

## 2024-01-02 DIAGNOSIS — F84.0 AUTISM SPECTRUM DISORDER: Primary | ICD-10-CM

## 2024-01-02 DIAGNOSIS — Z71.9 ENCOUNTER FOR EDUCATION: ICD-10-CM

## 2024-01-02 PROCEDURE — 99214 OFFICE O/P EST MOD 30 MIN: CPT | Mod: 95 | Performed by: NURSE PRACTITIONER

## 2024-01-02 NOTE — PROGRESS NOTES
Verification of Patient Location:  The patient affirms they are currently located in the following state: [place]    Request for Consent:    Audio and Video Encounter   Evelin, my name is MADY Murphy.  Before we proceed, can you please verify your identification by telling me your full name and date of birth?  Can you tell me who is in the room with you?    You and I are about to have a telemedicine check-in or visit because you have requested it.  This is a live video-conference.  I am a real person, speaking to you in real time.  There is no one else with me on the video-conference. I am not recording this conversation and I am asking you not to record it.  This telemedicine visit will be billed to your health insurance or you, if you are self-insured.  You understand you will be responsible for any copayments or coinsurances that apply to your telemedicine visit.  Communication platform used for this encounter:  [employer] Video Visit (Epic Video Client)       Before starting our telemedicine visit, I am required to get your consent for this virtual check-in or visit by telemedicine. Do you consent?      Patient Response to Request for Consent:  Yes      Visit Documentation:  Subjective     Patient ID: Mina Joyner is a 18 y.o. adult.  2005      HPI      Acne  - saw derm  - proceeding with acutane  - remains on bactrim and thinks it is helping  - has f/u in 2 weeks     Visit for injecting teaching today  - would like to change form topical to injection  - has correct supplies and 10ml vial from [facility]                The following have been reviewed and updated as appropriate in this visit:        Review of Systems  Constitutional: Negative for chills and fever.   Eyes: Negative for visual disturbance.   Respiratory: Negative for shortness of breath.    Cardiovascular: Negative for chest pain and leg swelling.   Gastrointestinal: Negative for abdominal pain, nausea and vomiting.   Genitourinary:  Negative for difficulty urinating, dysuria, flank pain, frequency, hematuria and urgency.   Musculoskeletal: Negative for myalgias.   Neurological: Negative for weakness, light-headedness and numbness.   See HPI for abnormal systems and evaluation of them    Patient Active Problem List   Diagnosis   • Attention deficit disorder (ADD)   • Autism spectrum disorder   • Depression   • Generalized anxiety disorder with panic attacks   • Family history of high blood pressure   • Gender dysphoria   • Healthcare maintenance       Current Outpatient Medications on File Prior to Visit   Medication Sig Dispense Refill   • FLUoxetine (PROzac) 20 mg tablet Take 1 tablet (20 mg total) by mouth daily. 90 tablet 1   • metoprolol tartrate (LOPRESSOR) 25 mg tablet TAKE 1 TABLET BY MOUTH TWICE DAILY AS NEEDED FOR PANIC/ANXIETY FOR 90 DAYS 180 tablet 0   • norethindrone ac-eth estradioL (LOESTRIN 1.5/30, 21,) 1.5-30 mg-mcg tablet Take 1 pill each night for 180 days (6 months)- skip placebo week and go to next packet 126 each 1   • testosterone cypionate (DEPO-TESTOSTERONE) 200 mg/mL injection Inject 0.3 mL (60 mg total) under the skin once a week. 10 mL 0     No current facility-administered medications on file prior to visit.       Wt Readings from Last 3 Encounters:   12/13/23 71.6 kg (157 lb 12.8 oz)   07/21/23 71.1 kg (156 lb 12.8 oz) (85%, Z= 1.04)*   03/10/23 64.9 kg (143 lb) (73%, Z= 0.62)*     * Growth percentiles are based on CDC (Girls, 2-20 Years) data.     Temp Readings from Last 3 Encounters:   12/13/23 36.8 °C (98.3 °F) (Temporal)   07/21/23 36.7 °C (98.1 °F)   03/10/23 36.6 °C (97.9 °F) (Temporal)     BP Readings from Last 3 Encounters:   12/13/23 122/78   07/21/23 125/73   03/10/23 130/78     Pulse Readings from Last 3 Encounters:   12/13/23 78   07/21/23 64   03/10/23 (!) 110         Assessment/Plan   Diagnoses and all orders for this visit:    Encounter for education (Primary)  Patient able to self administer 0.3ml of  prescribed hormones into right upper outer buttock with minimal support and instruction  Teaching sheet provided for future support and instruction as needed.   Reminded of labs and f/u visit in 3 months  Discussed correct disposal of sharps and supplies  Discussed correct storage of hormone vial out of direct sunlight and at room temperature.   Discussed potential for slight soreness at injection site over next 24 hours and that any pain, redness or bleeding which is beyond that time is outside of what we expect and please reach out to us at the office.     He will have labs completed at school  Will message me for orders- he needs them mailed and will use [facility].     Autism spectrum disorder  - stable condition  - no changes        Time Spent:  I spent 23 minutes on this date of service performing the following activities: obtaining history, documenting and providing counseling and education.   C section from 1/8/18. Status post mag for preeclampsia

## 2024-01-15 ENCOUNTER — APPOINTMENT (RX ONLY)
Dept: URBAN - METROPOLITAN AREA CLINIC 28 | Facility: CLINIC | Age: 19
Setting detail: DERMATOLOGY
End: 2024-01-15

## 2024-01-15 DIAGNOSIS — L70.0 ACNE VULGARIS: ICD-10-CM

## 2024-01-15 DIAGNOSIS — Z79.899 OTHER LONG TERM (CURRENT) DRUG THERAPY: ICD-10-CM

## 2024-01-15 PROCEDURE — ? ISOTRETINOIN INITIATION

## 2024-01-15 PROCEDURE — ? ADDITIONAL NOTES

## 2024-01-15 PROCEDURE — ? PRESCRIPTION MEDICATION MANAGEMENT

## 2024-01-15 PROCEDURE — ? HIGH RISK MEDICATION MONITORING

## 2024-01-15 PROCEDURE — 99214 OFFICE O/P EST MOD 30 MIN: CPT | Mod: 95

## 2024-01-15 PROCEDURE — ? ORDER TESTS

## 2024-01-15 PROCEDURE — ? COUNSELING

## 2024-01-15 ASSESSMENT — LOCATION ZONE DERM
LOCATION ZONE: FACE
LOCATION ZONE: ARM

## 2024-01-15 ASSESSMENT — LOCATION SIMPLE DESCRIPTION DERM
LOCATION SIMPLE: LEFT CHEEK
LOCATION SIMPLE: RIGHT POSTERIOR UPPER ARM
LOCATION SIMPLE: LEFT POSTERIOR UPPER ARM

## 2024-01-15 ASSESSMENT — LOCATION DETAILED DESCRIPTION DERM
LOCATION DETAILED: RIGHT PROXIMAL POSTERIOR UPPER ARM
LOCATION DETAILED: LEFT PROXIMAL POSTERIOR UPPER ARM
LOCATION DETAILED: LEFT CENTRAL MALAR CHEEK

## 2024-01-15 NOTE — PROCEDURE: ISOTRETINOIN INITIATION
Initial Beta Hcg (Optional): negative (12/18/23)
Anticipated Starting Dosage (Optional): 40mg Daily
Detail Level: Zone
Ipledge Number (Optional): [id number]

## 2024-01-15 NOTE — PROCEDURE: PRESCRIPTION MEDICATION MANAGEMENT
Render In Strict Bullet Format?: No
Continue Regimen: Sulfamethoxazole-trimethoprim 800mg-160mg capsule
Detail Level: Zone
Plan: Accutane (pt is hesitant) - PCP Dimitris Priest on board with plan.  Complicated by both hesitancy and hx of depression (stable per PCP and patient).  \\n\\n\\nPt will f/u with Dr. James Price in [place], [place] to start accutane.

## 2024-01-15 NOTE — PROCEDURE: ORDER TESTS
Bill For Surgical Tray: no
Lab Facility: 0
Billing Type: Third-Party Bill
Expected Date Of Service: 01/15/2024
Performing Laboratory: [phone]

## 2024-01-30 DIAGNOSIS — F41.1 GENERALIZED ANXIETY DISORDER WITH PANIC ATTACKS: ICD-10-CM

## 2024-01-30 DIAGNOSIS — F41.0 GENERALIZED ANXIETY DISORDER WITH PANIC ATTACKS: ICD-10-CM

## 2024-01-30 RX ORDER — FLUOXETINE 20 MG/1
20 TABLET ORAL DAILY
Qty: 90 TABLET | Refills: 1 | Status: SHIPPED | OUTPATIENT
Start: 2024-01-30 | End: 2024-05-15 | Stop reason: SDUPTHER

## 2024-02-10 DIAGNOSIS — F64.9 GENDER DYSPHORIA: ICD-10-CM

## 2024-02-12 RX ORDER — NORETHINDRONE ACETATE AND ETHINYL ESTRADIOL .03; 1.5 MG/1; MG/1
TABLET ORAL
Qty: 84 EACH | Refills: 1 | Status: SHIPPED | OUTPATIENT
Start: 2024-02-12 | End: 2024-03-08

## 2024-03-08 ENCOUNTER — OFFICE VISIT (OUTPATIENT)
Dept: PRIMARY CARE | Facility: CLINIC | Age: 19
End: 2024-03-08
Payer: COMMERCIAL

## 2024-03-08 ENCOUNTER — APPOINTMENT (OUTPATIENT)
Dept: LAB | Facility: CLINIC | Age: 19
End: 2024-03-08
Attending: NURSE PRACTITIONER
Payer: COMMERCIAL

## 2024-03-08 VITALS
HEIGHT: 65 IN | WEIGHT: 155.4 LBS | HEART RATE: 101 BPM | RESPIRATION RATE: 16 BRPM | DIASTOLIC BLOOD PRESSURE: 78 MMHG | TEMPERATURE: 97.1 F | SYSTOLIC BLOOD PRESSURE: 122 MMHG | BODY MASS INDEX: 25.89 KG/M2 | OXYGEN SATURATION: 97 %

## 2024-03-08 DIAGNOSIS — L70.0 ACNE VULGARIS: Primary | ICD-10-CM

## 2024-03-08 DIAGNOSIS — F64.9 GENDER DYSPHORIA: ICD-10-CM

## 2024-03-08 LAB
ERYTHROCYTE [DISTWIDTH] IN BLOOD BY AUTOMATED COUNT: 13.1 % (ref 11.7–14.4)
HCT VFR BLD AUTO: 45.4 % (ref 35–45)
HGB BLD-MCNC: 14.6 G/DL (ref 11.8–15.7)
MCH RBC QN AUTO: 28.1 PG (ref 28–33.2)
MCHC RBC AUTO-ENTMCNC: 32.2 G/DL (ref 32.2–35.5)
MCV RBC AUTO: 87.5 FL (ref 83–98)
PDW BLD AUTO: 10.3 FL (ref 9.4–12.3)
PLATELET # BLD AUTO: 319 K/UL (ref 150–369)
RBC # BLD AUTO: 5.19 M/UL (ref 3.93–5.22)
TESTOST SERPL-MCNC: 178.9 NG/DL (ref 7.2–79.3)
WBC # BLD AUTO: 7.16 K/UL (ref 3.8–10.5)

## 2024-03-08 PROCEDURE — 85027 COMPLETE CBC AUTOMATED: CPT

## 2024-03-08 PROCEDURE — 36415 COLL VENOUS BLD VENIPUNCTURE: CPT

## 2024-03-08 PROCEDURE — 99213 OFFICE O/P EST LOW 20 MIN: CPT | Performed by: NURSE PRACTITIONER

## 2024-03-08 PROCEDURE — 84403 ASSAY OF TOTAL TESTOSTERONE: CPT

## 2024-03-08 PROCEDURE — 3008F BODY MASS INDEX DOCD: CPT | Performed by: NURSE PRACTITIONER

## 2024-03-08 ASSESSMENT — PATIENT HEALTH QUESTIONNAIRE - PHQ9: SUM OF ALL RESPONSES TO PHQ9 QUESTIONS 1 & 2: 0

## 2024-03-08 NOTE — PROGRESS NOTES
Subjective      Patient ID: Mina Joyner is a 18 y.o. adult.  2005      HPI    Acne  - seeing derm in May, considering acutane  - he is uncomfortable with necessary monitoring required.   - treating otc at this time  - unsure if acne is being made better or worse with OBC    Gender Dysphoria  - changed to injecting 0.3ml SC weekly some months ago  - self injects, reports no issues  - will stop contraceptive- was being used for menstrual suppression  - minimal facial hair, + body hair, no current bottom growth that he is aware of  - he feels god with changes to this time        Anxiety  - feels it is stable at this time  - uses metoprolol PRN and finds it hepfil  - denies further syncope with use  - remains on prozac daily.     The following have been reviewed and updated as appropriate in this visit:        Review of Systems  Constitutional: Negative for chills and fever.   Eyes: Negative for visual disturbance.   Respiratory: Negative for shortness of breath.    Cardiovascular: Negative for chest pain and leg swelling.   Gastrointestinal: Negative for abdominal pain, nausea and vomiting.   Genitourinary: Negative for difficulty urinating, dysuria, flank pain, frequency, hematuria and urgency.   Musculoskeletal: Negative for myalgias.   Neurological: Negative for weakness, light-headedness and numbness.   See HPI for abnormal systems and evaluation of them    Patient Active Problem List   Diagnosis   • Attention deficit disorder (ADD)   • Autism spectrum disorder   • Depression   • Generalized anxiety disorder with panic attacks   • Family history of high blood pressure   • Gender dysphoria   • Healthcare maintenance       Current Outpatient Medications on File Prior to Visit   Medication Sig Dispense Refill   • FLUoxetine (PROzac) 20 mg tablet Take 1 tablet (20 mg total) by mouth daily. 90 tablet 1   • metoprolol tartrate (LOPRESSOR) 25 mg tablet TAKE 1 TABLET BY MOUTH TWICE DAILY AS NEEDED FOR  PANIC/ANXIETY FOR 90 DAYS 180 tablet 0   • testosterone cypionate (DEPO-TESTOSTERONE) 200 mg/mL injection Inject 0.3 mL (60 mg total) under the skin once a week. 10 mL 0     No current facility-administered medications on file prior to visit.       Wt Readings from Last 3 Encounters:   03/08/24 70.5 kg (155 lb 6.4 oz)   12/13/23 71.6 kg (157 lb 12.8 oz)   07/21/23 71.1 kg (156 lb 12.8 oz) (85%, Z= 1.04)*     * Growth percentiles are based on [facility] (Girls, 2-20 Years) data.     Temp Readings from Last 3 Encounters:   03/08/24 36.2 °C (97.1 °F) (Temporal)   12/13/23 36.8 °C (98.3 °F) (Temporal)   07/21/23 36.7 °C (98.1 °F)     BP Readings from Last 3 Encounters:   03/08/24 122/78   12/13/23 122/78   07/21/23 125/73     Pulse Readings from Last 3 Encounters:   03/08/24 (!) 101   12/13/23 78   07/21/23 64       Objective     There were no vitals filed for this visit.  There is no height or weight on file to calculate BMI.    Physical Exam  Cardiovascular:      Rate and Rhythm: Normal rate.   Pulmonary:      Effort: Pulmonary effort is normal.   Skin:     Comments: Acne is stable at this time- all open comedones, face and upper arms  Non-cellulitic appearing  + generalized erythema on cheeks noted     Neurological:      General: No focal deficit present.      Mental Status: He is oriented to person, place, and time.   Psychiatric:         Mood and Affect: Mood normal.         Behavior: Behavior normal.         Thought Content: Thought content normal.         Judgment: Judgment normal.         Assessment/Plan   Diagnoses and all orders for this visit:    Acne vulgaris (Primary)  -     clascoterone 1 % cream; Apply topically 2 (two) times a day.  - pursuing derm treatment in May when returns from school  - we discussed course even if he cannot complete it prior to returning to school would be beneficial  - ? If OBC stopping will improve acne      Gender dysphoria  -     CBC; Future  -     Testosterone; Future  - routine  labs  - doing well on T at this time  - once acne is under control I would recommend 0.4ml sc weekly    Anxiety  - stable at this time  - no changes to medication.     I asked him to find out if he received gardasil as an adolescent    I spent 25 minutes on this date of service performing the following activities: obtaining history, performing examination, entering orders, documenting and providing counseling and education.

## 2024-04-15 RX ORDER — NORETHINDRONE ACETATE AND ETHINYL ESTRADIOL .03; 1.5 MG/1; MG/1
TABLET ORAL
Qty: 90 EACH | Refills: 3 | Status: SHIPPED | OUTPATIENT
Start: 2024-04-15 | End: 2024-07-02 | Stop reason: SDUPTHER

## 2024-05-04 ENCOUNTER — APPOINTMENT (OUTPATIENT)
Dept: LAB | Age: 19
End: 2024-05-04
Attending: STUDENT IN AN ORGANIZED HEALTH CARE EDUCATION/TRAINING PROGRAM
Payer: COMMERCIAL

## 2024-05-04 ENCOUNTER — TRANSCRIBE ORDERS (OUTPATIENT)
Dept: LAB | Age: 19
End: 2024-05-04

## 2024-05-04 DIAGNOSIS — Z79.899 OTHER LONG TERM (CURRENT) DRUG THERAPY: Primary | ICD-10-CM

## 2024-05-04 DIAGNOSIS — Z79.899 OTHER LONG TERM (CURRENT) DRUG THERAPY: ICD-10-CM

## 2024-05-04 LAB
ALT SERPL-CCNC: 8 IU/L (ref 7–52)
HCG SERPL-ACNC: <0.6 IU/L (MIU/ML)
TRIGL SERPL-MCNC: 76 MG/DL

## 2024-05-04 PROCEDURE — 84478 ASSAY OF TRIGLYCERIDES: CPT

## 2024-05-04 PROCEDURE — 84702 CHORIONIC GONADOTROPIN TEST: CPT

## 2024-05-04 PROCEDURE — 36415 COLL VENOUS BLD VENIPUNCTURE: CPT

## 2024-05-04 PROCEDURE — 84460 ALANINE AMINO (ALT) (SGPT): CPT

## 2024-05-15 DIAGNOSIS — F41.0 GENERALIZED ANXIETY DISORDER WITH PANIC ATTACKS: ICD-10-CM

## 2024-05-15 DIAGNOSIS — F41.1 GENERALIZED ANXIETY DISORDER WITH PANIC ATTACKS: ICD-10-CM

## 2024-05-15 RX ORDER — FLUOXETINE 20 MG/1
20 TABLET ORAL DAILY
Qty: 90 TABLET | Refills: 0 | Status: SHIPPED | OUTPATIENT
Start: 2024-05-15 | End: 2024-05-15 | Stop reason: SDUPTHER

## 2024-06-07 ENCOUNTER — TRANSCRIBE ORDERS (OUTPATIENT)
Dept: LAB | Age: 19
End: 2024-06-07

## 2024-06-07 ENCOUNTER — APPOINTMENT (OUTPATIENT)
Dept: LAB | Age: 19
End: 2024-06-07
Attending: STUDENT IN AN ORGANIZED HEALTH CARE EDUCATION/TRAINING PROGRAM
Payer: COMMERCIAL

## 2024-06-07 DIAGNOSIS — Z79.899 OTHER LONG TERM (CURRENT) DRUG THERAPY: ICD-10-CM

## 2024-06-07 DIAGNOSIS — Z79.899 OTHER LONG TERM (CURRENT) DRUG THERAPY: Primary | ICD-10-CM

## 2024-06-07 LAB — HCG UR QL: NEGATIVE

## 2024-06-07 PROCEDURE — 36415 COLL VENOUS BLD VENIPUNCTURE: CPT

## 2024-06-07 PROCEDURE — 84703 CHORIONIC GONADOTROPIN ASSAY: CPT

## 2024-07-02 DIAGNOSIS — F64.9 GENDER DYSPHORIA: Primary | ICD-10-CM

## 2024-07-02 DIAGNOSIS — F64.9 GENDER DYSPHORIA: ICD-10-CM

## 2024-07-02 RX ORDER — NORETHINDRONE ACETATE AND ETHINYL ESTRADIOL .03; 1.5 MG/1; MG/1
TABLET ORAL
Qty: 90 EACH | Refills: 0 | Status: SHIPPED | OUTPATIENT
Start: 2024-07-02 | End: 2024-12-16 | Stop reason: SDUPTHER

## 2024-07-02 NOTE — TELEPHONE ENCOUNTER
Testosterone Cypionate 200 mg/mL  Filled on 12/13/2023  # 10/140  PDMP checked with no red flags  Medicine Refill Request    Last Office Visit: 3/8/2024   Last Consult Visit: Visit date not found  Last Telemedicine Visit: 1/2/2024 Donnell Nye CRNP    Next Appointment: 7/17/2024

## 2024-07-03 RX ORDER — TESTOSTERONE CYPIONATE 200 MG/ML
60 INJECTION, SOLUTION INTRAMUSCULAR WEEKLY
Qty: 4 ML | Refills: 3 | Status: SHIPPED | OUTPATIENT
Start: 2024-07-03 | End: 2024-07-08 | Stop reason: SDUPTHER

## 2024-07-03 RX ORDER — NEEDLES, SAFETY 18GX1 1/2"
1 NEEDLE, DISPOSABLE MISCELLANEOUS WEEKLY
Qty: 12 EACH | Refills: 0 | Status: SHIPPED | OUTPATIENT
Start: 2024-07-03 | End: 2025-01-07

## 2024-07-03 RX ORDER — SYRINGE, DISPOSABLE, 1 ML
1 SYRINGE, EMPTY DISPOSABLE MISCELLANEOUS WEEKLY
Qty: 12 EACH | Refills: 0 | Status: SHIPPED | OUTPATIENT
Start: 2024-07-03 | End: 2024-10-04

## 2024-07-03 RX ORDER — NEEDLES, SAFETY 22GX1 1/2"
1 NEEDLE, DISPOSABLE MISCELLANEOUS WEEKLY
Qty: 12 EACH | Refills: 0 | Status: SHIPPED | OUTPATIENT
Start: 2024-07-03 | End: 2025-01-07

## 2024-07-08 DIAGNOSIS — F64.9 GENDER DYSPHORIA: ICD-10-CM

## 2024-07-08 RX ORDER — TESTOSTERONE CYPIONATE 200 MG/ML
60 INJECTION, SOLUTION INTRAMUSCULAR WEEKLY
Qty: 10 ML | Refills: 0 | Status: SHIPPED | OUTPATIENT
Start: 2024-07-08 | End: 2024-07-17

## 2024-07-12 ENCOUNTER — TRANSCRIBE ORDERS (OUTPATIENT)
Dept: LAB | Age: 19
End: 2024-07-12

## 2024-07-12 ENCOUNTER — APPOINTMENT (OUTPATIENT)
Dept: LAB | Age: 19
End: 2024-07-12
Attending: STUDENT IN AN ORGANIZED HEALTH CARE EDUCATION/TRAINING PROGRAM
Payer: COMMERCIAL

## 2024-07-12 DIAGNOSIS — Z79.899 OTHER LONG TERM (CURRENT) DRUG THERAPY: ICD-10-CM

## 2024-07-12 DIAGNOSIS — Z79.899 OTHER LONG TERM (CURRENT) DRUG THERAPY: Primary | ICD-10-CM

## 2024-07-12 LAB — HCG UR QL: NEGATIVE

## 2024-07-12 PROCEDURE — 84703 CHORIONIC GONADOTROPIN ASSAY: CPT

## 2024-07-12 PROCEDURE — 36415 COLL VENOUS BLD VENIPUNCTURE: CPT

## 2024-07-17 ENCOUNTER — TELEMEDICINE (OUTPATIENT)
Dept: PRIMARY CARE | Facility: CLINIC | Age: 19
End: 2024-07-17
Payer: COMMERCIAL

## 2024-07-17 DIAGNOSIS — L70.0 ACNE VULGARIS: ICD-10-CM

## 2024-07-17 DIAGNOSIS — F64.9 GENDER DYSPHORIA: Primary | ICD-10-CM

## 2024-07-17 PROCEDURE — 99213 OFFICE O/P EST LOW 20 MIN: CPT | Mod: 95 | Performed by: NURSE PRACTITIONER

## 2024-07-17 RX ORDER — TESTOSTERONE CYPIONATE 200 MG/ML
80 INJECTION, SOLUTION INTRAMUSCULAR WEEKLY
Qty: 10 ML | Refills: 0 | Status: SHIPPED | OUTPATIENT
Start: 2024-07-17 | End: 2024-11-08

## 2024-07-17 NOTE — PROGRESS NOTES
Verification of Patient Location:  The patient affirms they are currently located in the following state: [place]    Request for Consent:    Audio and Video Encounter   Evelin, my name is MADY Murphy.  Before we proceed, can you please verify your identification by telling me your full name and date of birth?  Can you tell me who is in the room with you?    You and I are about to have a telemedicine check-in or visit because you have requested it.  This is a live video-conference.  I am a real person, speaking to you in real time.  There is no one else with me on the video-conference. I am not recording this conversation and I am asking you not to record it.  This telemedicine visit will be billed to your health insurance or you, if you are self-insured.  You understand you will be responsible for any copayments or coinsurances that apply to your telemedicine visit.  Communication platform used for this encounter:  [email or web address] Video Visit (Epic Video Client)       Before starting our telemedicine visit, I am required to get your consent for this virtual check-in or visit by telemedicine. Do you consent?    Patient Response to Request for Consent:  Yes      Visit Documentation:  Subjective     Patient ID: Mina Joyner is a 18 y.o. adult.  2005      HPI    Gender Dysphoria  - continues on OBC  - using injections  - 0.3ml SC weekly  - mood stable, continues to talk to friends  - masculinization is slow. No voice change, minimal hair growth, minimal muscle mass development      Acne  - started acutane 2nd month  - seeing good results  - tolerating well, moisturizing often, cautious with sun          The following have been reviewed and updated as appropriate in this visit:        Review of Systems  Constitutional: Negative for chills and fever.   Eyes: Negative for visual disturbance.   Respiratory: Negative for shortness of breath.    Cardiovascular: Negative for chest pain and leg swelling.  "  Gastrointestinal: Negative for abdominal pain, nausea and vomiting.   Genitourinary: Negative for difficulty urinating, dysuria, flank pain, frequency, hematuria and urgency.   Musculoskeletal: Negative for myalgias.   Neurological: Negative for weakness, light-headedness and numbness.   See HPI for abnormal systems and evaluation of them    Patient Active Problem List   Diagnosis    Attention deficit disorder (ADD)    Autism spectrum disorder    Depression    Generalized anxiety disorder with panic attacks    Family history of high blood pressure    Gender dysphoria    Healthcare maintenance       Current Outpatient Medications on File Prior to Visit   Medication Sig Dispense Refill    clascoterone 1 % cream Apply topically 2 (two) times a day. 60 g 3    FLUoxetine (PROzac) 20 mg tablet Take 1 tablet (20 mg total) by mouth daily. 90 tablet 0    metoprolol tartrate (LOPRESSOR) 25 mg tablet TAKE 1 TABLET BY MOUTH TWICE DAILY AS NEEDED FOR PANIC/ANXIETY FOR 90 DAYS 180 tablet 0    norethindrone ac-eth estradioL (LOESTRIN 1.5/30, 21,) 1.5-30 mg-mcg tablet Take daily, SKIP PLACEBO WEEK and precede to next packet for 90 days 90 each 0    safety needles (BD SAFETYGLIDE NEEDLE) 18 gauge x 1 1/2\" needle 1 Needle once a week. Use to draw up hormone. 12 each 0    safety needles (BD SAFETYGLIDE NEEDLE) 25 gauge x 5/8\" needle Inject 1 Needle under the skin once a week. Use to inject hormone. 12 each 0    syringe, disposable, (BD LUER-ANJU SYRINGE) 1 mL syringe 1 Syringe once a week. Use to inject hormone. 12 each 0    testosterone cypionate (DEPO-TESTOSTERONE) 200 mg/mL injection Inject 0.3 mL (60 mg total) under the skin once a week. 10 mL 0     No current facility-administered medications on file prior to visit.       Wt Readings from Last 3 Encounters:   03/08/24 70.5 kg (155 lb 6.4 oz)   12/13/23 71.6 kg (157 lb 12.8 oz)   07/21/23 71.1 kg (156 lb 12.8 oz) (85%, Z= 1.04)*     * Growth percentiles are based on CDC (Girls, " 2-20 Years) data.     Temp Readings from Last 3 Encounters:   03/08/24 36.2 °C (97.1 °F) (Temporal)   12/13/23 36.8 °C (98.3 °F) (Temporal)   07/21/23 36.7 °C (98.1 °F)     BP Readings from Last 3 Encounters:   03/08/24 122/78   12/13/23 122/78   07/21/23 125/73     Pulse Readings from Last 3 Encounters:   03/08/24 (!) 101   12/13/23 78   07/21/23 64         Assessment/Plan   Diagnoses and all orders for this visit:    Gender dysphoria (Primary)  - will increase to 0.4ml SC weekly in late August.   - I would like to see 2 months on acutane prior to increasing  - just refilled with [facility]  - labs sometime this fall  - he will message me when he is home on break Tgiving or Xmas and I can order them to [facility] lab    Acne vulgaris  - improving  - seeing derm regularly  - continue therapy.     F/u 4 months (November/December) on increased dose and to review labs to reflect that change      Time Spent:  I spent 22 minutes on this date of service performing the following activities: obtaining history, entering orders, documenting, and providing counseling and education.

## 2024-08-07 ENCOUNTER — APPOINTMENT (OUTPATIENT)
Dept: LAB | Age: 19
End: 2024-08-07
Attending: STUDENT IN AN ORGANIZED HEALTH CARE EDUCATION/TRAINING PROGRAM
Payer: COMMERCIAL

## 2024-08-07 ENCOUNTER — TRANSCRIBE ORDERS (OUTPATIENT)
Dept: LAB | Age: 19
End: 2024-08-07

## 2024-08-07 DIAGNOSIS — Z79.899 OTHER LONG TERM (CURRENT) DRUG THERAPY: ICD-10-CM

## 2024-08-07 DIAGNOSIS — Z79.899 OTHER LONG TERM (CURRENT) DRUG THERAPY: Primary | ICD-10-CM

## 2024-08-07 LAB — HCG UR QL: NEGATIVE

## 2024-08-07 PROCEDURE — 84703 CHORIONIC GONADOTROPIN ASSAY: CPT

## 2024-08-07 PROCEDURE — 36415 COLL VENOUS BLD VENIPUNCTURE: CPT

## 2024-08-15 ENCOUNTER — APPOINTMENT (OUTPATIENT)
Dept: LAB | Age: 19
End: 2024-08-15
Attending: STUDENT IN AN ORGANIZED HEALTH CARE EDUCATION/TRAINING PROGRAM
Payer: COMMERCIAL

## 2024-08-15 ENCOUNTER — TRANSCRIBE ORDERS (OUTPATIENT)
Dept: LAB | Age: 19
End: 2024-08-15

## 2024-08-15 DIAGNOSIS — Z79.899 OTHER LONG TERM (CURRENT) DRUG THERAPY: Primary | ICD-10-CM

## 2024-10-03 DIAGNOSIS — F64.9 GENDER DYSPHORIA: ICD-10-CM

## 2024-10-03 NOTE — TELEPHONE ENCOUNTER
"Medicine Refill Request    Last Office Visit: 3/8/2024   Last Consult Visit: Visit date not found  Last Telemedicine Visit: 7/17/2024 Donnell Nye CRNP    Next Appointment: Visit date not found      Current Outpatient Medications:     clascoterone 1 % cream, Apply topically 2 (two) times a day., Disp: 60 g, Rfl: 3    FLUoxetine (PROzac) 20 mg tablet, Take 1 tablet (20 mg total) by mouth daily., Disp: 90 tablet, Rfl: 0    metoprolol tartrate (LOPRESSOR) 25 mg tablet, TAKE 1 TABLET BY MOUTH TWICE DAILY AS NEEDED FOR PANIC/ANXIETY FOR 90 DAYS, Disp: 180 tablet, Rfl: 0    norethindrone ac-eth estradioL (LOESTRIN 1.5/30, 21,) 1.5-30 mg-mcg tablet, Take daily, SKIP PLACEBO WEEK and precede to next packet for 90 days, Disp: 90 each, Rfl: 0    safety needles (BD SAFETYGLIDE NEEDLE) 18 gauge x 1 1/2\" needle, 1 Needle once a week. Use to draw up hormone., Disp: 12 each, Rfl: 0    safety needles (BD SAFETYGLIDE NEEDLE) 25 gauge x 5/8\" needle, Inject 1 Needle under the skin once a week. Use to inject hormone., Disp: 12 each, Rfl: 0    syringe, disposable, (BD LUER-ANJU SYRINGE) 1 mL syringe, 1 Syringe once a week. Use to inject hormone., Disp: 12 each, Rfl: 0    testosterone cypionate (DEPO-TESTOSTERONE) 200 mg/mL injection, Inject 0.4 mL (80 mg total) under the skin once a week., Disp: 10 mL, Rfl: 0      BP Readings from Last 3 Encounters:   03/08/24 122/78   12/13/23 122/78   07/21/23 125/73       Recent Lab results:  No results found for: \"CHOL\", No results found for: \"HDL\", No results found for: \"LDLCALC\",   Lab Results   Component Value Date    TRIG 143 08/15/2024        Lab Results   Component Value Date    GLUCOSE 76 07/21/2023   , No results found for: \"HGBA1C\"      Lab Results   Component Value Date    CREATININE 0.6 07/21/2023       No results found for: \"TSH\"      No results found for: \"HGBA1C\"  "

## 2024-10-04 RX ORDER — SYRINGE, DISPOSABLE, 1 ML
1 SYRINGE, EMPTY DISPOSABLE MISCELLANEOUS WEEKLY
Qty: 12 EACH | Refills: 11 | Status: SHIPPED | OUTPATIENT
Start: 2024-10-04 | End: 2025-01-07

## 2024-10-18 ENCOUNTER — TRANSCRIBE ORDERS (OUTPATIENT)
Dept: LAB | Age: 19
End: 2024-10-18

## 2024-10-18 ENCOUNTER — APPOINTMENT (OUTPATIENT)
Dept: LAB | Age: 19
End: 2024-10-18
Attending: STUDENT IN AN ORGANIZED HEALTH CARE EDUCATION/TRAINING PROGRAM
Payer: COMMERCIAL

## 2024-10-18 DIAGNOSIS — Z79.899 OTHER LONG TERM (CURRENT) DRUG THERAPY: ICD-10-CM

## 2024-10-18 DIAGNOSIS — Z79.899 OTHER LONG TERM (CURRENT) DRUG THERAPY: Primary | ICD-10-CM

## 2024-10-18 PROCEDURE — 84703 CHORIONIC GONADOTROPIN ASSAY: CPT

## 2024-10-18 PROCEDURE — 36415 COLL VENOUS BLD VENIPUNCTURE: CPT

## 2024-10-19 LAB — HCG UR QL: NEGATIVE

## 2024-11-07 DIAGNOSIS — F64.9 GENDER DYSPHORIA: ICD-10-CM

## 2024-11-07 NOTE — TELEPHONE ENCOUNTER
Medicine Refill Request    Last Office Visit: 3/8/2024   Last Consult Visit: Visit date not found  Last Telemedicine Visit: 7/17/2024 Donnell Nye CRNP    Next Appointment: Visit date not found

## 2024-11-08 DIAGNOSIS — F41.0 GENERALIZED ANXIETY DISORDER WITH PANIC ATTACKS: ICD-10-CM

## 2024-11-08 DIAGNOSIS — F41.1 GENERALIZED ANXIETY DISORDER WITH PANIC ATTACKS: ICD-10-CM

## 2024-11-08 RX ORDER — TESTOSTERONE CYPIONATE 200 MG/ML
60 INJECTION, SOLUTION INTRAMUSCULAR WEEKLY
Qty: 4 ML | Refills: 3 | Status: SHIPPED | OUTPATIENT
Start: 2024-11-08 | End: 2025-01-07

## 2024-11-08 RX ORDER — FLUOXETINE 20 MG/1
20 TABLET ORAL DAILY
Qty: 90 TABLET | Refills: 0 | Status: SHIPPED | OUTPATIENT
Start: 2024-11-08

## 2024-12-20 ENCOUNTER — APPOINTMENT (OUTPATIENT)
Dept: LAB | Age: 19
End: 2024-12-20
Attending: STUDENT IN AN ORGANIZED HEALTH CARE EDUCATION/TRAINING PROGRAM
Payer: COMMERCIAL

## 2024-12-20 ENCOUNTER — TRANSCRIBE ORDERS (OUTPATIENT)
Dept: LAB | Age: 19
End: 2024-12-20

## 2024-12-20 DIAGNOSIS — Z79.899 OTHER LONG TERM (CURRENT) DRUG THERAPY: Primary | ICD-10-CM

## 2024-12-20 DIAGNOSIS — Z79.899 OTHER LONG TERM (CURRENT) DRUG THERAPY: ICD-10-CM

## 2024-12-20 LAB — HCG UR QL: NEGATIVE

## 2024-12-20 PROCEDURE — 36415 COLL VENOUS BLD VENIPUNCTURE: CPT

## 2024-12-20 PROCEDURE — 84703 CHORIONIC GONADOTROPIN ASSAY: CPT

## 2025-01-07 ENCOUNTER — TELEMEDICINE (OUTPATIENT)
Dept: PRIMARY CARE | Facility: CLINIC | Age: 20
End: 2025-01-07
Payer: COMMERCIAL

## 2025-01-07 DIAGNOSIS — F84.0 AUTISM SPECTRUM DISORDER: ICD-10-CM

## 2025-01-07 DIAGNOSIS — L70.0 ACNE VULGARIS: Primary | ICD-10-CM

## 2025-01-07 DIAGNOSIS — F40.10 SOCIAL PHOBIA: ICD-10-CM

## 2025-01-07 DIAGNOSIS — F64.9 GENDER DYSPHORIA: ICD-10-CM

## 2025-01-07 PROCEDURE — 99213 OFFICE O/P EST LOW 20 MIN: CPT | Mod: 95 | Performed by: NURSE PRACTITIONER

## 2025-01-07 RX ORDER — TESTOSTERONE ENANTHATE 75 MG/.5ML
75 INJECTION SUBCUTANEOUS
Qty: 2 ML | Refills: 3 | Status: SHIPPED | OUTPATIENT
Start: 2025-01-07

## 2025-01-07 NOTE — PROGRESS NOTES
Verification of Patient Location:  The patient affirms they are currently located in the following state: [place]    Are you in your home or a private residence? Yes    Request for Consent:    Audio and Video Encounter   Evelin, my name is MADY Murphy.  Before we proceed, can you please verify your identification by telling me your full name and date of birth?  Can you tell me who is in the room with you?    You and I are about to have a telemedicine check-in or visit because you have requested it.  This is a live video-conference.  I am a real person, speaking to you in real time.  There is no one else with me on the video-conference. I am not recording this conversation and I am asking you not to record it.  This telemedicine visit will be billed to your health insurance or you, if you are self-insured.  You understand you will be responsible for any copayments or coinsurances that apply to your telemedicine visit.  Communication platform used for this encounter:  [employer] Video Visit (Epic Video Client)       Before starting our telemedicine visit, I am required to get your consent for this virtual check-in or visit by telemedicine. Do you consent?    Patient Response to Request for Consent:  Yes       MADY Murphy   [facility]  [facility] [address] [address] [phone]                  Consent obtained from patient and all parties present in the room? yes          History of Present Illness  The patient presents via virtual visit for evaluation of acne, anxiety and depression, and testosterone therapy.    He is currently in the penultimate month of his Accutane treatment, which he reports as beneficial despite some associated discomfort. He is under the care of Dr. Powell.    His mental health has shown significant improvement, with the previous semester being particularly challenging due  to his capstone project. He anticipates a less stressful semester ahead. He continues his Prozac 20 mg regimen, which effectively manages his anxiety and depression. Panic attacks have been infrequent recently, and he feels equipped to handle them when they occur. He uses metoprolol to manage anxiety associated tachycardia, either as a preventative measure before entering crowded spaces or as needed, but not on a daily basis.    He is also on a testosterone injection regimen of 0.4 mL SC weekly, which he reports as going well. He has been on this treatment for approximately 1.5 years and has noticed an increase in muscle mass, although he acknowledges a need for more exercise. He has also observed bottom growth, voice changes, and a slight increase in height. He reports frontal hair recession but no hair loss at the back of his head. His libido remains low. He has experienced difficulty with the injections due to a past fainting episode, which he attributes to fear rather than the medication itself. He does not currently lie down during the injection process but is open to doing so.   When he used topical testosterone in the past, this exacerbated his acne significantly.     SOCIAL HISTORY  He is in his fourth year of college.    MEDICATIONS  Current: Accutane, Prozac, metoprolol, testosterone         Past Medical History:   Diagnosis Date    ADD (attention deficit disorder)     Autism     OCD (obsessive compulsive disorder)        No past surgical history on file.    Social History     Tobacco Use    Smoking status: Never    Smokeless tobacco: Never   Substance Use Topics    Drug use: Never       Family History   Problem Relation Name Age of Onset    Hypertension Biological Mother      Thyroid disease Biological Mother      No Known Problems Biological Father      Breast cancer Maternal Grandmother      Bipolar disorder Other Multiple family members            Current Outpatient Medications:     clascoterone 1 %  "cream, Apply topically 2 (two) times a day., Disp: 60 g, Rfl: 3    FLUoxetine (PROzac) 20 mg tablet, Take 1 tablet (20 mg total) by mouth daily., Disp: 90 tablet, Rfl: 0    metoprolol tartrate (LOPRESSOR) 25 mg tablet, TAKE 1 TABLET BY MOUTH TWICE DAILY AS NEEDED FOR PANIC/ANXIETY FOR 90 DAYS, Disp: 180 tablet, Rfl: 0    norethindrone ac-eth estradioL (LOESTRIN 1.5/30, 21,) 1.5-30 mg-mcg tablet, Take daily, SKIP PLACEBO WEEK and precede to next packet for 90 days, Disp: 90 each, Rfl: 0    safety needles (BD SAFETYGLIDE NEEDLE) 18 gauge x 1 1/2\" needle, 1 Needle once a week. Use to draw up hormone., Disp: 12 each, Rfl: 0    safety needles (BD SAFETYGLIDE NEEDLE) 25 gauge x 5/8\" needle, Inject 1 Needle under the skin once a week. Use to inject hormone., Disp: 12 each, Rfl: 0    syringe, disposable, (BD LUER-ANJU SYRINGE) 1 mL syringe, 1 SYRINGE ONCE A WEEK. USE TO INJECT HORMONE., Disp: 12 each, Rfl: 11    testosterone cypionate (DEPO-TESTOTERONE) 200 mg/mL injection, Inject 0.3 mL (60 mg total) under the skin once a week., Disp: 4 mL, Rfl: 3    Constitutional: Negative for chills and fever.   Eyes: Negative for visual disturbance.   Respiratory: Negative for shortness of breath.    Cardiovascular: Negative for chest pain and leg swelling.   Gastrointestinal: Negative for abdominal pain, nausea and vomiting.   Genitourinary: Negative for difficulty urinating, dysuria, flank pain, frequency, hematuria and urgency.   Musculoskeletal: Negative for myalgias.   Neurological: Negative for weakness, light-headedness and numbness.   See HPI for abnormal systems and evaluation of them      Lab Results   Component Value Date    WBC 7.16 03/08/2024    HGB 14.6 03/08/2024    HCT 45.4 (H) 03/08/2024     03/08/2024    TRIG 143 08/15/2024    ALT 13 08/15/2024    AST 14 07/21/2023     07/21/2023    K 4.6 07/21/2023     07/21/2023    CREATININE 0.6 07/21/2023    BUN 11 07/21/2023    TESTOSTERONE 178.9 (H) 03/08/2024 "         Assessment   Diagnoses and all orders for this visit:    Acne vulgaris (Primary)  - stable condition  - under the care of dermatology  - completing current therapy.     Autism spectrum disorder  - stable condition  - no changes    Social phobia  - continue PRN metoprolol  - continue prozac daily    Gender dysphoria  -     testosterone enanthate (XYOSTED) 75 mg/0.5 mL auto-injector; Inject 75 mg under the skin every (seven) 7 days.  CHANGE to pen to decrease syncopal episodes associated with injection anxiety  - I anticipate increasing dose in the future when acne is managed  - overall doing well, no dose change (aside from slight change with available doses in pen)  - advised copay card for pen  Labs in spring when he returns from school to [place]  F/u 6 months       Return in about 6 months (around 7/7/2025).        MADY Murphy     1/7/2025    I spent 22 minutes on this date of service performing the following activities: obtaining history, entering orders, documenting, and providing counseling and education.    Attestation

## 2025-01-10 ENCOUNTER — TRANSCRIBE ORDERS (OUTPATIENT)
Dept: LAB | Age: 20
End: 2025-01-10

## 2025-01-10 ENCOUNTER — APPOINTMENT (OUTPATIENT)
Dept: LAB | Age: 20
End: 2025-01-10
Attending: STUDENT IN AN ORGANIZED HEALTH CARE EDUCATION/TRAINING PROGRAM
Payer: COMMERCIAL

## 2025-01-10 DIAGNOSIS — Z79.899 OTHER LONG TERM (CURRENT) DRUG THERAPY: ICD-10-CM

## 2025-01-10 DIAGNOSIS — Z79.899 OTHER LONG TERM (CURRENT) DRUG THERAPY: Primary | ICD-10-CM

## 2025-01-10 LAB — HCG SERPL-ACNC: <0.6 IU/L (MIU/ML)

## 2025-01-10 PROCEDURE — 36415 COLL VENOUS BLD VENIPUNCTURE: CPT

## 2025-01-10 PROCEDURE — 84702 CHORIONIC GONADOTROPIN TEST: CPT

## 2025-02-26 DIAGNOSIS — E34.9 ENDOCRINOPATHY: Primary | ICD-10-CM

## 2025-02-26 RX ORDER — SYRINGE, DISPOSABLE, 1 ML
SYRINGE, EMPTY DISPOSABLE MISCELLANEOUS
Qty: 12 EACH | Refills: 11 | Status: SHIPPED | OUTPATIENT
Start: 2025-02-26

## 2025-02-26 RX ORDER — NEEDLES, DISPOSABLE 18GX1 1/2"
NEEDLE, DISPOSABLE MISCELLANEOUS
Qty: 12 EACH | Refills: 11 | Status: SHIPPED | OUTPATIENT
Start: 2025-02-26

## 2025-03-08 DIAGNOSIS — F64.9 GENDER DYSPHORIA: ICD-10-CM

## 2025-03-10 RX ORDER — NORETHINDRONE ACETATE AND ETHINYL ESTRADIOL .03; 1.5 MG/1; MG/1
TABLET ORAL
Qty: 84 EACH | Refills: 3 | Status: SHIPPED | OUTPATIENT
Start: 2025-03-10

## 2025-05-03 DIAGNOSIS — F41.0 GENERALIZED ANXIETY DISORDER WITH PANIC ATTACKS: ICD-10-CM

## 2025-05-03 DIAGNOSIS — F41.1 GENERALIZED ANXIETY DISORDER WITH PANIC ATTACKS: ICD-10-CM

## 2025-05-05 RX ORDER — FLUOXETINE 20 MG/1
20 TABLET ORAL DAILY
Qty: 90 TABLET | Refills: 0 | OUTPATIENT
Start: 2025-05-05

## 2025-07-25 ENCOUNTER — OFFICE VISIT (OUTPATIENT)
Dept: PRIMARY CARE | Facility: CLINIC | Age: 20
End: 2025-07-25
Payer: COMMERCIAL

## 2025-07-25 VITALS
DIASTOLIC BLOOD PRESSURE: 81 MMHG | BODY MASS INDEX: 30.92 KG/M2 | OXYGEN SATURATION: 98 % | HEART RATE: 60 BPM | WEIGHT: 185.8 LBS | TEMPERATURE: 98.1 F | SYSTOLIC BLOOD PRESSURE: 119 MMHG | RESPIRATION RATE: 16 BRPM

## 2025-07-25 DIAGNOSIS — Z23 NEED FOR VACCINATION: ICD-10-CM

## 2025-07-25 DIAGNOSIS — F64.9 GENDER DYSPHORIA: Primary | ICD-10-CM

## 2025-07-25 PROCEDURE — 3008F BODY MASS INDEX DOCD: CPT | Performed by: NURSE PRACTITIONER

## 2025-07-25 PROCEDURE — 99214 OFFICE O/P EST MOD 30 MIN: CPT | Performed by: NURSE PRACTITIONER

## 2025-07-25 RX ORDER — TESTOSTERONE ENANTHATE 75 MG/.5ML
75 INJECTION SUBCUTANEOUS
Qty: 2 ML | Refills: 3 | Status: SHIPPED | OUTPATIENT
Start: 2025-07-25

## 2025-07-25 ASSESSMENT — ENCOUNTER SYMPTOMS
HEMATOLOGIC/LYMPHATIC NEGATIVE: 1
GASTROINTESTINAL NEGATIVE: 1
MUSCULOSKELETAL NEGATIVE: 1
ALLERGIC/IMMUNOLOGIC NEGATIVE: 1
PSYCHIATRIC NEGATIVE: 1
RESPIRATORY NEGATIVE: 1
CONSTITUTIONAL NEGATIVE: 1
ENDOCRINE NEGATIVE: 1
CARDIOVASCULAR NEGATIVE: 1
EYES NEGATIVE: 1
NEUROLOGICAL NEGATIVE: 1

## 2025-07-25 ASSESSMENT — PATIENT HEALTH QUESTIONNAIRE - PHQ9: SUM OF ALL RESPONSES TO PHQ9 QUESTIONS 1 & 2: 1

## 2025-07-25 NOTE — PATIENT INSTRUCTIONS
[facility]      Roxie Guy  [phone]       Nitish Justin ([facility])   [address], [address]  [place] [address]  [phone]    Lucinaa Jimenez ([facility])  [phone]

## 2025-07-25 NOTE — PROGRESS NOTES
{(Determine Patient Interest  Today, [provider name] is using a new technology to assist with their documentation in the electronic medical record.  The technology is called MARY, and it helps [provider name] capture all the details of your conversation using an audio recording of your visit.  The recording is then used to help [provider name] write their visit note, and it ultimately allows [provider name] to be more focused on you during the visit.  Are you ok if [provider name] uses the MARY technology during today's appointment?):[id number]}    Patient Agreeable to MARY?  yes  {(IF YES-If at any point during the visit anyone present wants the recording stopped, please let your provider know and they will do so.  If NO- No problem.  We'll conduct today's visit without an audio recording.):[id number]}

## 2025-07-25 NOTE — ASSESSMENT & PLAN NOTE
- reviewed recommendation for cervical cancer screening beginning at age of 21; discussed possible screening methods  - plan to revisit cervical cancer screening at future appointment   Orders:    CBC; Future    Testosterone; Future    testosterone enanthate (XYOSTED) 75 mg/0.5 mL auto-injector; Inject 75 mg under the skin every (seven) 7 days.

## 2025-07-25 NOTE — PROGRESS NOTES
"Subjective      Patient ID: Mina Joyner is a 19 y.o. adult.  2005 uses he/they pronouns, presents today for 6 month follow-up appointment.    HPI    Pt last seen via telemedicine on 1/7/25. Since last visit, pt has graduated college and is now back in the [place] area, looking for a job.    Last visit, testosterone weekly injection dose was increased. Since increase in dose over the past 6 months, pt has noticed more facial hair, muscle growth and body odor. Has also noticed some further bottom development and increased sex drive. Overall, pt has been on testosterone for approximately 1.5 years. Pt is satisfied with overall masculinization progress, though would like further vocal deepening and muscle development. Is also interested in top surgery.    For social anxiety, pt takes metoprolol 25mg as needed. Medication is helpful in managing anxiety. Pt is also starting to see a therapist who specializes in CBT, autism, and LBTQIA care. Visits will be every other week, in person or telehealth.    For menstrual suppression, pt continues on norethindrone. Reports no period in the past year. Is no longer on Accutane for management of acne.      The following have been reviewed and updated as appropriate in this visit:   Allergies  Meds  Problems       No Known Allergies   Past Medical History:   Diagnosis Date    ADD (attention deficit disorder)     Autism     OCD (obsessive compulsive disorder)     No past surgical history on file.   Current Outpatient Medications   Medication Instructions    FLUoxetine (PROZAC) 20 mg, oral, Daily    metoprolol tartrate (LOPRESSOR) 25 mg tablet TAKE 1 TABLET BY MOUTH TWICE DAILY AS NEEDED FOR PANIC/ANXIETY FOR 90 DAYS    needle, disp, 18 G (BD PRECISIONGLIDE NON-STERILE) 18 gauge x 1 1/2\" needle 1 weekly    needle, disp, 25 gauge 25 gauge x 5/8\" needle Use to inject hormone subcutaneously weekly.    norethindrone ac-eth estradioL (JUNEL 1.5/30, 21,) 1.5-30 mg-mcg tablet " TAKE DAILY, SKIP PLACEBO WEEK AND PRECEDE TO NEXT PACKET FOR 90 DAYS    syringe, disposable, (BD LUER-ANJU SYRINGE) 1 mL syringe 1 weekly    XYOSTED 75 mg, subcutaneous, Every 7 days      Family History   Problem Relation Name Age of Onset    Hypertension Biological Mother      Thyroid disease Biological Mother      No Known Problems Biological Father      Breast cancer Maternal Grandmother      Bipolar disorder Other Multiple family members       Social History     Tobacco Use    Smoking status: Never    Smokeless tobacco: Never   Vaping Use    Vaping status: Never Used   Substance Use Topics    Alcohol use: Yes     Comment: 1 cocktail or 1 cider a month    Drug use: Never      Review of Systems   Constitutional: Negative.    HENT: Negative.     Eyes: Negative.    Respiratory: Negative.     Cardiovascular: Negative.    Gastrointestinal: Negative.    Endocrine: Negative.    Genitourinary: Negative.    Musculoskeletal: Negative.    Skin: Negative.    Allergic/Immunologic: Negative.    Neurological: Negative.    Hematological: Negative.    Psychiatric/Behavioral: Negative.       Objective     No visits with results within 6 Month(s) from this visit.   Latest known visit with results is:   Appointment on 01/10/2025   Component Date Value Ref Range Status    hCG Quant 01/10/2025 <0.6  <5.0 IU/L (mIU/mL) Final      Vitals:    07/25/25 1402   BP: 119/81   BP Location: Left upper arm   Patient Position: Sitting   Pulse: 60   Resp: 16   Temp: 36.7 °C (98.1 °F)   TempSrc: Temporal   SpO2: 98%   Weight: 84.3 kg (185 lb 12.8 oz)     Body mass index is 30.92 kg/m².    Physical Exam  Vitals reviewed.   Constitutional:       General: He is not in acute distress.     Appearance: Normal appearance. He is normal weight. He is not ill-appearing.   HENT:      Head: Normocephalic and atraumatic.      Mouth/Throat:      Mouth: Mucous membranes are moist.      Pharynx: Oropharynx is clear. No oropharyngeal exudate or posterior  oropharyngeal erythema.   Eyes:      Extraocular Movements: Extraocular movements intact.      Conjunctiva/sclera: Conjunctivae normal.      Pupils: Pupils are equal, round, and reactive to light.   Cardiovascular:      Rate and Rhythm: Normal rate and regular rhythm.      Pulses: Normal pulses.      Heart sounds: Normal heart sounds.   Pulmonary:      Effort: Pulmonary effort is normal. No respiratory distress.      Breath sounds: Normal breath sounds.   Abdominal:      General: Bowel sounds are normal.      Palpations: Abdomen is soft.   Musculoskeletal:         General: No swelling. Normal range of motion.      Cervical back: Normal range of motion and neck supple. No rigidity or tenderness.   Lymphadenopathy:      Cervical: No cervical adenopathy.   Skin:     General: Skin is warm and dry.      Capillary Refill: Capillary refill takes less than 2 seconds.   Neurological:      General: No focal deficit present.      Mental Status: He is alert and oriented to person, place, and time.   Psychiatric:         Mood and Affect: Mood normal.         Behavior: Behavior normal.         Thought Content: Thought content normal.         Judgment: Judgment normal.       Assessment & Plan  Gender dysphoria  - reviewed recommendation for cervical cancer screening beginning at age of 21; discussed possible screening methods  - plan to revisit cervical cancer screening at future appointment   Orders:    CBC; Future    Testosterone; Future    testosterone enanthate (XYOSTED) 75 mg/0.5 mL auto-injector; Inject 75 mg under the skin every (seven) 7 days.    Need for vaccination  - will administer Tdap and meningococcal vaccines at next appointment         Pt encouraged to call or message office with any questions or concerns.     student. I have reviewed the patient's history and student's findings on exam, the patient's diagnosis/differential diagnosis and treatment plan. I, as the teaching clinician, verify in the medical record all student documentation and findings, including history, physical exam, and/or medical decision making. I have repeated the physical exam performed by this student.       I spent 25 minutes on this date of service performing the following activities: obtaining history, performing examination, entering orders, documenting, and providing counseling and education.

## 2025-07-27 DIAGNOSIS — F41.1 GENERALIZED ANXIETY DISORDER WITH PANIC ATTACKS: ICD-10-CM

## 2025-07-27 DIAGNOSIS — F41.0 GENERALIZED ANXIETY DISORDER WITH PANIC ATTACKS: ICD-10-CM

## 2025-07-28 RX ORDER — FLUOXETINE 20 MG/1
20 TABLET ORAL DAILY
Qty: 90 TABLET | Refills: 3 | Status: SHIPPED | OUTPATIENT
Start: 2025-07-28 | End: 2025-07-30 | Stop reason: SDUPTHER

## 2025-07-29 ENCOUNTER — APPOINTMENT (OUTPATIENT)
Dept: LAB | Age: 20
End: 2025-07-29
Attending: NURSE PRACTITIONER
Payer: COMMERCIAL

## 2025-07-29 DIAGNOSIS — F64.9 GENDER DYSPHORIA: ICD-10-CM

## 2025-07-29 LAB
ERYTHROCYTE [DISTWIDTH] IN BLOOD BY AUTOMATED COUNT: 12.9 % (ref 11.7–14.4)
HCT VFR BLD AUTO: 44.8 % (ref 35–45)
HGB BLD-MCNC: 14.4 G/DL (ref 11.8–15.7)
MCH RBC QN AUTO: 27.4 PG (ref 28–33.2)
MCHC RBC AUTO-ENTMCNC: 32.1 G/DL (ref 32.2–35.5)
MCV RBC AUTO: 85.2 FL (ref 83–98)
PLATELET # BLD AUTO: 325 K/UL (ref 150–369)
PMV BLD AUTO: 9.8 FL (ref 9.4–12.3)
RBC # BLD AUTO: 5.26 M/UL (ref 3.93–5.22)
TESTOST SERPL-MCNC: 572.2 NG/DL (ref 7.2–79.3)
WBC # BLD AUTO: 10.33 K/UL (ref 3.8–10.5)

## 2025-07-29 PROCEDURE — 84403 ASSAY OF TOTAL TESTOSTERONE: CPT

## 2025-07-29 PROCEDURE — 36415 COLL VENOUS BLD VENIPUNCTURE: CPT

## 2025-07-29 PROCEDURE — 85027 COMPLETE CBC AUTOMATED: CPT

## 2025-07-30 DIAGNOSIS — Z23 NEED FOR VACCINATION: Primary | ICD-10-CM

## 2025-07-30 DIAGNOSIS — Z23 NEED FOR TDAP VACCINATION: ICD-10-CM

## 2025-07-30 DIAGNOSIS — F41.0 GENERALIZED ANXIETY DISORDER WITH PANIC ATTACKS: ICD-10-CM

## 2025-07-30 DIAGNOSIS — F41.1 GENERALIZED ANXIETY DISORDER WITH PANIC ATTACKS: ICD-10-CM

## 2025-07-30 DIAGNOSIS — E34.9 ENDOCRINOPATHY: ICD-10-CM

## 2025-07-30 RX ORDER — SYRINGE, DISPOSABLE, 1 ML
SYRINGE, EMPTY DISPOSABLE MISCELLANEOUS
Qty: 12 EACH | Refills: 11 | Status: SHIPPED | OUTPATIENT
Start: 2025-07-30

## 2025-07-30 RX ORDER — NEEDLES, DISPOSABLE 18GX1 1/2"
NEEDLE, DISPOSABLE MISCELLANEOUS
Qty: 12 EACH | Refills: 11 | Status: SHIPPED | OUTPATIENT
Start: 2025-07-30

## 2025-07-30 RX ORDER — FLUOXETINE 20 MG/1
20 TABLET ORAL DAILY
Qty: 90 TABLET | Refills: 3 | Status: SHIPPED | OUTPATIENT
Start: 2025-07-30

## 2025-08-21 DIAGNOSIS — F64.9 GENDER DYSPHORIA: ICD-10-CM

## 2025-08-22 RX ORDER — NORETHINDRONE ACETATE AND ETHINYL ESTRADIOL .03; 1.5 MG/1; MG/1
TABLET ORAL
Qty: 84 EACH | Refills: 3 | OUTPATIENT
Start: 2025-08-22

## 2025-09-02 ENCOUNTER — TELEPHONE (OUTPATIENT)
Dept: PRIMARY CARE | Facility: CLINIC | Age: 20
End: 2025-09-02
Payer: COMMERCIAL

## 2025-09-05 ENCOUNTER — OFFICE VISIT (OUTPATIENT)
Dept: PRIMARY CARE | Facility: CLINIC | Age: 20
End: 2025-09-05
Payer: COMMERCIAL

## 2025-09-05 VITALS
RESPIRATION RATE: 16 BRPM | SYSTOLIC BLOOD PRESSURE: 118 MMHG | HEART RATE: 99 BPM | DIASTOLIC BLOOD PRESSURE: 79 MMHG | OXYGEN SATURATION: 97 % | WEIGHT: 190.6 LBS | BODY MASS INDEX: 31.72 KG/M2

## 2025-09-05 DIAGNOSIS — Z23 NEED FOR TDAP VACCINATION: ICD-10-CM

## 2025-09-05 DIAGNOSIS — Z23 NEED FOR VACCINATION: Primary | ICD-10-CM
